# Patient Record
Sex: FEMALE | Race: WHITE | Employment: OTHER | URBAN - METROPOLITAN AREA
[De-identification: names, ages, dates, MRNs, and addresses within clinical notes are randomized per-mention and may not be internally consistent; named-entity substitution may affect disease eponyms.]

---

## 2017-08-21 ENCOUNTER — HOSPITAL ENCOUNTER (OUTPATIENT)
Dept: GENERAL RADIOLOGY | Age: 82
Discharge: HOME | End: 2017-08-21
Attending: GENERAL PRACTICE | Admitting: GENERAL PRACTICE
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
## 2017-08-21 DIAGNOSIS — M54.5 ACUTE LOW BACK PAIN, UNSPECIFIED BACK PAIN LATERALITY, WITH SCIATICA PRESENCE UNSPECIFIED: ICD-10-CM

## 2018-06-01 ENCOUNTER — HOSPITAL ENCOUNTER (OUTPATIENT)
Dept: OTHER | Age: 83
Discharge: HOME | End: 2018-06-30
Attending: INTERNAL MEDICINE | Admitting: INTERNAL MEDICINE

## 2018-06-15 LAB
ALBUMIN SERPL-MCNC: 4.2 GM/DL (ref 3.4–5)
ALP BLD-CCNC: 87 IU/L (ref 40–128)
ALT SERPL-CCNC: 7 U/L (ref 10–40)
ANION GAP SERPL CALCULATED.3IONS-SCNC: 11 MMOL/L (ref 4–16)
AST SERPL-CCNC: 18 IU/L (ref 15–37)
BILIRUB SERPL-MCNC: 0.6 MG/DL (ref 0–1)
BUN BLDV-MCNC: 23 MG/DL (ref 6–23)
CALCIUM SERPL-MCNC: 9 MG/DL (ref 8.3–10.6)
CHLORIDE BLD-SCNC: 106 MMOL/L (ref 99–110)
CHOLESTEROL: 158 MG/DL
CO2: 27 MMOL/L (ref 21–32)
CREAT SERPL-MCNC: 1.1 MG/DL (ref 0.6–1.1)
GFR AFRICAN AMERICAN: 56 ML/MIN/1.73M2
GFR NON-AFRICAN AMERICAN: 46 ML/MIN/1.73M2
GLUCOSE BLD-MCNC: 90 MG/DL (ref 70–99)
HCT VFR BLD CALC: 40.8 % (ref 37–47)
HDLC SERPL-MCNC: 45 MG/DL
HEMOGLOBIN: 12.7 GM/DL (ref 12.5–16)
LDL CHOLESTEROL DIRECT: 105 MG/DL
MCH RBC QN AUTO: 29.7 PG (ref 27–31)
MCHC RBC AUTO-ENTMCNC: 31.1 % (ref 32–36)
MCV RBC AUTO: 95.3 FL (ref 78–100)
PDW BLD-RTO: 13.9 % (ref 11.7–14.9)
PLATELET # BLD: 132 K/CU MM (ref 140–440)
PMV BLD AUTO: 10.4 FL (ref 7.5–11.1)
POTASSIUM SERPL-SCNC: 4.5 MMOL/L (ref 3.5–5.1)
RBC # BLD: 4.28 M/CU MM (ref 4.2–5.4)
SODIUM BLD-SCNC: 144 MMOL/L (ref 135–145)
TOTAL PROTEIN: 6.5 GM/DL (ref 6.4–8.2)
TRIGL SERPL-MCNC: 99 MG/DL
TSH HIGH SENSITIVITY: 5.59 UIU/ML (ref 0.27–4.2)
WBC # BLD: 4.1 K/CU MM (ref 4–10.5)

## 2018-06-22 LAB
T3 FREE: 2.8 PG/ML (ref 2.3–4.2)
T4 FREE: 1.08 NG/DL (ref 0.9–1.8)
TSH HIGH SENSITIVITY: 5.69 UIU/ML (ref 0.27–4.2)

## 2018-07-01 ENCOUNTER — HOSPITAL ENCOUNTER (OUTPATIENT)
Dept: OTHER | Age: 83
Discharge: HOME | End: 2018-07-31
Attending: INTERNAL MEDICINE | Admitting: INTERNAL MEDICINE

## 2018-11-20 ENCOUNTER — HOSPITAL ENCOUNTER (OUTPATIENT)
Age: 83
Setting detail: SPECIMEN
Discharge: HOME | End: 2018-11-20

## 2018-11-30 ENCOUNTER — HOSPITAL ENCOUNTER (OUTPATIENT)
Age: 83
Discharge: HOME | End: 2018-11-30
Payer: MEDICARE

## 2018-11-30 LAB
ALBUMIN SERPL-MCNC: 4 GM/DL (ref 3.4–5)
ALP BLD-CCNC: 79 IU/L (ref 40–128)
ALT SERPL-CCNC: 10 U/L (ref 10–40)
AMMONIA: 32 UMOL/L (ref 11–51)
ANION GAP SERPL CALCULATED.3IONS-SCNC: 14 MMOL/L (ref 4–16)
AST SERPL-CCNC: 19 IU/L (ref 15–37)
BILIRUB SERPL-MCNC: 0.4 MG/DL (ref 0–1)
BUN BLDV-MCNC: 28 MG/DL (ref 6–23)
CALCIUM SERPL-MCNC: 9.3 MG/DL (ref 8.3–10.6)
CHLORIDE BLD-SCNC: 103 MMOL/L (ref 99–110)
CO2: 24 MMOL/L (ref 21–32)
CREAT SERPL-MCNC: 1.1 MG/DL (ref 0.6–1.1)
GFR AFRICAN AMERICAN: 56 ML/MIN/1.73M2
GFR NON-AFRICAN AMERICAN: 46 ML/MIN/1.73M2
GLUCOSE BLD-MCNC: 99 MG/DL (ref 70–99)
HCT VFR BLD CALC: 38.7 % (ref 37–47)
HEMOGLOBIN: 12.4 GM/DL (ref 12.5–16)
MCH RBC QN AUTO: 29.7 PG (ref 27–31)
MCHC RBC AUTO-ENTMCNC: 32 % (ref 32–36)
MCV RBC AUTO: 92.8 FL (ref 78–100)
PDW BLD-RTO: 13.4 % (ref 11.7–14.9)
PLATELET # BLD: 161 K/CU MM (ref 140–440)
PMV BLD AUTO: 10 FL (ref 7.5–11.1)
POTASSIUM SERPL-SCNC: 4.3 MMOL/L (ref 3.5–5.1)
RBC # BLD: 4.17 M/CU MM (ref 4.2–5.4)
SODIUM BLD-SCNC: 141 MMOL/L (ref 135–145)
TOTAL PROTEIN: 6.5 GM/DL (ref 6.4–8.2)
TSH HIGH SENSITIVITY: 4.92 UIU/ML (ref 0.27–4.2)
WBC # BLD: 4.9 K/CU MM (ref 4–10.5)

## 2018-11-30 PROCEDURE — 80053 COMPREHEN METABOLIC PANEL: CPT

## 2018-11-30 PROCEDURE — 85027 COMPLETE CBC AUTOMATED: CPT

## 2018-11-30 PROCEDURE — 36415 COLL VENOUS BLD VENIPUNCTURE: CPT

## 2018-11-30 PROCEDURE — 84443 ASSAY THYROID STIM HORMONE: CPT

## 2018-11-30 PROCEDURE — 82140 ASSAY OF AMMONIA: CPT

## 2019-01-04 ENCOUNTER — HOSPITAL ENCOUNTER (OUTPATIENT)
Age: 84
Discharge: HOME | End: 2019-01-04

## 2019-01-04 LAB — TSH HIGH SENSITIVITY: 2.34 UIU/ML (ref 0.27–4.2)

## 2019-01-04 PROCEDURE — 84443 ASSAY THYROID STIM HORMONE: CPT

## 2019-01-04 PROCEDURE — 36415 COLL VENOUS BLD VENIPUNCTURE: CPT

## 2019-04-12 ENCOUNTER — HOSPITAL ENCOUNTER (OUTPATIENT)
Age: 84
Setting detail: SPECIMEN
Discharge: HOME | End: 2019-04-12
Payer: MEDICARE

## 2019-04-12 LAB
ALBUMIN SERPL-MCNC: 4.3 GM/DL (ref 3.4–5)
ALP BLD-CCNC: 90 IU/L (ref 40–129)
ALT SERPL-CCNC: 8 U/L (ref 10–40)
ANION GAP SERPL CALCULATED.3IONS-SCNC: 13 MMOL/L (ref 4–16)
AST SERPL-CCNC: 17 IU/L (ref 15–37)
BILIRUB SERPL-MCNC: 0.3 MG/DL (ref 0–1)
BUN BLDV-MCNC: 22 MG/DL (ref 6–23)
CALCIUM SERPL-MCNC: 9.1 MG/DL (ref 8.3–10.6)
CHLORIDE BLD-SCNC: 105 MMOL/L (ref 99–110)
CO2: 25 MMOL/L (ref 21–32)
CREAT SERPL-MCNC: 1 MG/DL (ref 0.6–1.1)
GFR AFRICAN AMERICAN: >60 ML/MIN/1.73M2
GFR NON-AFRICAN AMERICAN: 52 ML/MIN/1.73M2
GLUCOSE BLD-MCNC: 88 MG/DL (ref 70–99)
HCT VFR BLD CALC: 42.9 % (ref 37–47)
HEMOGLOBIN: 13.1 GM/DL (ref 12.5–16)
MCH RBC QN AUTO: 28.8 PG (ref 27–31)
MCHC RBC AUTO-ENTMCNC: 30.5 % (ref 32–36)
MCV RBC AUTO: 94.3 FL (ref 78–100)
PDW BLD-RTO: 14.1 % (ref 11.7–14.9)
PLATELET # BLD: 174 K/CU MM (ref 140–440)
PMV BLD AUTO: 10 FL (ref 7.5–11.1)
POTASSIUM SERPL-SCNC: 4.7 MMOL/L (ref 3.5–5.1)
RBC # BLD: 4.55 M/CU MM (ref 4.2–5.4)
SODIUM BLD-SCNC: 143 MMOL/L (ref 135–145)
TOTAL PROTEIN: 6.6 GM/DL (ref 6.4–8.2)
TSH HIGH SENSITIVITY: 3.61 UIU/ML (ref 0.27–4.2)
WBC # BLD: 5.6 K/CU MM (ref 4–10.5)

## 2019-04-12 PROCEDURE — 84443 ASSAY THYROID STIM HORMONE: CPT

## 2019-04-12 PROCEDURE — 80053 COMPREHEN METABOLIC PANEL: CPT

## 2019-04-12 PROCEDURE — 85027 COMPLETE CBC AUTOMATED: CPT

## 2019-04-12 PROCEDURE — 36415 COLL VENOUS BLD VENIPUNCTURE: CPT

## 2019-08-30 ENCOUNTER — HOSPITAL ENCOUNTER (OUTPATIENT)
Age: 84
Discharge: HOME | End: 2019-08-30
Payer: MEDICARE

## 2019-08-30 LAB
ALBUMIN SERPL-MCNC: 3.8 GM/DL (ref 3.4–5)
ALP BLD-CCNC: 85 IU/L (ref 40–128)
ALT SERPL-CCNC: 8 U/L (ref 10–40)
ANION GAP SERPL CALCULATED.3IONS-SCNC: 10 MMOL/L (ref 4–16)
AST SERPL-CCNC: 17 IU/L (ref 15–37)
BILIRUB SERPL-MCNC: 0.3 MG/DL (ref 0–1)
BUN BLDV-MCNC: 25 MG/DL (ref 6–23)
CALCIUM SERPL-MCNC: 9.2 MG/DL (ref 8.3–10.6)
CHLORIDE BLD-SCNC: 104 MMOL/L (ref 99–110)
CO2: 26 MMOL/L (ref 21–32)
CREAT SERPL-MCNC: 1.2 MG/DL (ref 0.6–1.1)
GFR AFRICAN AMERICAN: 51 ML/MIN/1.73M2
GFR NON-AFRICAN AMERICAN: 42 ML/MIN/1.73M2
GLUCOSE BLD-MCNC: 87 MG/DL (ref 70–99)
HCT VFR BLD CALC: 39.8 % (ref 37–47)
HEMOGLOBIN: 12.5 GM/DL (ref 12.5–16)
MCH RBC QN AUTO: 29.5 PG (ref 27–31)
MCHC RBC AUTO-ENTMCNC: 31.4 % (ref 32–36)
MCV RBC AUTO: 93.9 FL (ref 78–100)
PDW BLD-RTO: 13.7 % (ref 11.7–14.9)
PLATELET # BLD: 133 K/CU MM (ref 140–440)
PMV BLD AUTO: 9.8 FL (ref 7.5–11.1)
POTASSIUM SERPL-SCNC: 4.3 MMOL/L (ref 3.5–5.1)
RBC # BLD: 4.24 M/CU MM (ref 4.2–5.4)
SODIUM BLD-SCNC: 140 MMOL/L (ref 135–145)
TOTAL PROTEIN: 6.2 GM/DL (ref 6.4–8.2)
TSH HIGH SENSITIVITY: 2.16 UIU/ML (ref 0.27–4.2)
WBC # BLD: 4.6 K/CU MM (ref 4–10.5)

## 2019-08-30 PROCEDURE — 84443 ASSAY THYROID STIM HORMONE: CPT

## 2019-08-30 PROCEDURE — 80053 COMPREHEN METABOLIC PANEL: CPT

## 2019-08-30 PROCEDURE — 36415 COLL VENOUS BLD VENIPUNCTURE: CPT

## 2019-08-30 PROCEDURE — 85027 COMPLETE CBC AUTOMATED: CPT

## 2020-08-13 ENCOUNTER — HOSPITAL ENCOUNTER (OUTPATIENT)
Age: 85
Setting detail: SPECIMEN
Discharge: HOME | End: 2020-08-13
Payer: MEDICARE

## 2020-08-13 PROCEDURE — U0002 COVID-19 LAB TEST NON-CDC: HCPCS

## 2020-08-14 LAB
SARS-COV-2: NOT DETECTED
SOURCE: NORMAL

## 2020-09-07 ENCOUNTER — APPOINTMENT (OUTPATIENT)
Dept: CT IMAGING | Age: 85
End: 2020-09-07
Payer: MEDICARE

## 2020-09-07 ENCOUNTER — HOSPITAL ENCOUNTER (EMERGENCY)
Age: 85
Discharge: HOME | End: 2020-09-07
Payer: MEDICARE

## 2020-09-07 VITALS
SYSTOLIC BLOOD PRESSURE: 169 MMHG | RESPIRATION RATE: 18 BRPM | HEART RATE: 70 BPM | HEIGHT: 61 IN | WEIGHT: 160 LBS | DIASTOLIC BLOOD PRESSURE: 77 MMHG | TEMPERATURE: 98.6 F | OXYGEN SATURATION: 97 % | BODY MASS INDEX: 30.21 KG/M2

## 2020-09-07 PROCEDURE — 72125 CT NECK SPINE W/O DYE: CPT

## 2020-09-07 PROCEDURE — 6370000000 HC RX 637 (ALT 250 FOR IP): Performed by: PHYSICIAN ASSISTANT

## 2020-09-07 PROCEDURE — 2500000003 HC RX 250 WO HCPCS: Performed by: PHYSICIAN ASSISTANT

## 2020-09-07 PROCEDURE — 4500000028 HC INTERMEDIATE PROCEDURE

## 2020-09-07 PROCEDURE — 70486 CT MAXILLOFACIAL W/O DYE: CPT

## 2020-09-07 PROCEDURE — 99284 EMERGENCY DEPT VISIT MOD MDM: CPT

## 2020-09-07 PROCEDURE — 70450 CT HEAD/BRAIN W/O DYE: CPT

## 2020-09-07 RX ORDER — DIAPER,BRIEF,INFANT-TODD,DISP
EACH MISCELLANEOUS ONCE
Status: COMPLETED | OUTPATIENT
Start: 2020-09-07 | End: 2020-09-07

## 2020-09-07 RX ORDER — ACETAMINOPHEN 325 MG/1
650 TABLET ORAL ONCE
Status: COMPLETED | OUTPATIENT
Start: 2020-09-07 | End: 2020-09-07

## 2020-09-07 RX ORDER — LIDOCAINE HYDROCHLORIDE 20 MG/ML
10 INJECTION, SOLUTION INFILTRATION; PERINEURAL ONCE
Status: COMPLETED | OUTPATIENT
Start: 2020-09-07 | End: 2020-09-07

## 2020-09-07 RX ORDER — LIDOCAINE/RACEPINEP/TETRACAINE 4-0.05-0.5
SOLUTION WITH PREFILLED APPLICATOR (ML) TOPICAL ONCE
Status: COMPLETED | OUTPATIENT
Start: 2020-09-07 | End: 2020-09-07

## 2020-09-07 RX ADMIN — Medication 3 ML: at 11:29

## 2020-09-07 RX ADMIN — BACITRACIN ZINC 1 G: 500 OINTMENT TOPICAL at 13:22

## 2020-09-07 RX ADMIN — LIDOCAINE HYDROCHLORIDE 10 ML: 20 INJECTION, SOLUTION INFILTRATION; PERINEURAL at 12:45

## 2020-09-07 RX ADMIN — ACETAMINOPHEN 650 MG: 325 TABLET ORAL at 11:28

## 2020-09-07 ASSESSMENT — PAIN SCALES - GENERAL
PAINLEVEL_OUTOF10: 5
PAINLEVEL_OUTOF10: 2
PAINLEVEL_OUTOF10: 1

## 2020-09-07 NOTE — ED NOTES
This nurse spoke with the patient's ride back home. She is on her way here.       Miguel Barlow RN  09/07/20 [id number]

## 2020-09-07 NOTE — ED PROVIDER NOTES
Allergies    SOCIAL & FAMILY HISTORY    Social History     Socioeconomic History    Marital status:      Spouse name: Not on file    Number of children: Not on file    Years of education: Not on file    Highest education level: Not on file   Occupational History    Not on file   Social Needs    Financial resource strain: Not on file    Food insecurity     Worry: Not on file     Inability: Not on file    Transportation needs     Medical: Not on file     Non-medical: Not on file   Tobacco Use    Smoking status: Never Smoker   Substance and Sexual Activity    Alcohol use: Yes     Comment: q. six months    Drug use: No    Sexual activity: Not on file   Lifestyle    Physical activity     Days per week: Not on file     Minutes per session: Not on file    Stress: Not on file   Relationships    Social connections     Talks on phone: Not on file     Gets together: Not on file     Attends Denominational service: Not on file     Active member of club or organization: Not on file     Attends meetings of clubs or organizations: Not on file     Relationship status: Not on file    Intimate partner violence     Fear of current or ex partner: Not on file     Emotionally abused: Not on file     Physically abused: Not on file     Forced sexual activity: Not on file   Other Topics Concern    Not on file   Social History Narrative    Not on file     No family history on file. PHYSICAL EXAM    VITAL SIGNS: BP (!) 185/73   Pulse 69   Temp 98 °F (36.7 °C) (Oral)   Resp 16   Ht 5' 1\" (1.549 m)   Wt 160 lb (72.6 kg)   SpO2 94%   BMI 30.23 kg/m²    Constitutional:  Well developed, well nourished, no acute distress   Eyes: EOMI. PERRL, sclera nonicteric. Anterior chambers clear. Funduscopic exam without any gross abnormality or hemorrhages. HENT:  Atraumatic, no trismus. Ears canals and TMs free of blood or clear fluid. Nasal passages and oropharynx free of blood or clear fluid.   Evidence of bruising into the nasal bridge area, no blood in the bilateral nares, no obvious signs of septal hematoma. No circumferential periorbital ecchymosis or mastoid ecchymosis noted. Neck/Lymphatics: supple, no JVD, no swollen nodes. No posterior neck tenderness. Range of motion without obvious pain or deficit. Respiratory:  Lungs Clear, no retractions   Cardiovascular:   normal rate, no murmurs  GI:  Soft, nontender, normal bowel soun*ds  Musculoskeletal:  No edema, no obvious defect or deformity, palpable bony tenderness, swelling, discoloration to the bilateral upper and lower extremities. Bilateral upper and lower extremities neurovascular intact. BACK: There is not thoracic or lumbar midline tenderness to palpation or step-offs. Paraspinal tenderness to palpation is not present in the paracervical, parathoracic and paralumbar region. No overlying rashes. LE strength is 5/5. LE light touch is intact. LE DTR's are 2+ in the patellas and achilles. Straight leg test is negative on the RIGHT, negative on the LEFT. Integument: 2 cm superficial laceration to the chin area, no active bleeding. Has a small 1 cm laceration to the bottom lip area, not through and through  Neurologic:    - Alert & oriented person, place, time, and situation, no speech difficulties or slurring.  - No obvious gross motor deficits  - Cranial nerves 2-12 grossly intact  - Negative meningeal signs.  - Sensation intact to light touch  - Strength 5/5 in upper and lower extremities bilaterally  - Light touch sensation intact throughout. - Upper and lower extremity DTRs 2+ bilaterally.   Psych: Pleasant affect, no hallucinations      RADIOLOGY   Ct Head Wo Contrast    Result Date: 9/7/2020  EXAMINATION: CT OF THE HEAD WITHOUT CONTRAST; CT OF THE FACE WITHOUT CONTRAST  9/7/2020 11:58 am TECHNIQUE: CT of the head was performed without the administration of intravenous contrast. Dose modulation, iterative reconstruction, and/or weight based adjustment of the mA/kV was utilized to reduce the radiation dose to as low as reasonably achievable.; CT of the face was performed without the administration of intravenous contrast. Multiplanar reformatted images are provided for review. Dose modulation, iterative reconstruction, and/or weight based adjustment of the mA/kV was utilized to reduce the radiation dose to as low as reasonably achievable. COMPARISON: None. HISTORY: ORDERING SYSTEM PROVIDED HISTORY: fall TECHNOLOGIST PROVIDED HISTORY: Reason for exam:->fall Has a \"code stroke\" or \"stroke alert\" been called? ->No Reason for Exam: fall, head/neck/facial pain. Acuity: Acute Type of Exam: Initial Mechanism of Injury: fall, head/neck/facial pain. Relevant Medical/Surgical History: none FINDINGS: BRAIN/VENTRICLES: The cerebral and cerebellar parenchyma demonstrate volume loss. There are scattered and confluent low-attenuation areas noted supratentorially, compatible with chronic microvascular white matter ischemic disease. There are no areas of acute hemorrhage, mass, or midline shift. The ventricles are enlarged, likely related to involutional change. Gray-white differentiation is maintained without evidence of acute infarct. ORBITS: Lens implants from cataract surgery are noted bilaterally. No soft tissue swelling. SINUSES: There is scattered mild paranasal sinus disease with greatest involvement in the right maxillary sinus. The mastoid air cells are clear. SOFT TISSUES/SKULL:  The facial bones are intact. Asymmetric soft tissue swelling is noted along the left mandibular symphysis. Streak artifact from dental amalgam is noted. There is no evidence of acute facial bone fracture. No appreciable scalp soft tissue swelling. The calvarium is intact. 1. No acute intracranial abnormality. 2. Cerebral and cerebellar parenchymal volume loss with chronic microvascular white matter ischemic disease. 3. Soft tissue swelling along the left mandibular symphysis.   No evidence of an underlying facial bone fracture. Ct Facial Bones Wo Contrast    Result Date: 9/7/2020  EXAMINATION: CT OF THE HEAD WITHOUT CONTRAST; CT OF THE FACE WITHOUT CONTRAST  9/7/2020 11:58 am TECHNIQUE: CT of the head was performed without the administration of intravenous contrast. Dose modulation, iterative reconstruction, and/or weight based adjustment of the mA/kV was utilized to reduce the radiation dose to as low as reasonably achievable.; CT of the face was performed without the administration of intravenous contrast. Multiplanar reformatted images are provided for review. Dose modulation, iterative reconstruction, and/or weight based adjustment of the mA/kV was utilized to reduce the radiation dose to as low as reasonably achievable. COMPARISON: None. HISTORY: ORDERING SYSTEM PROVIDED HISTORY: fall TECHNOLOGIST PROVIDED HISTORY: Reason for exam:->fall Has a \"code stroke\" or \"stroke alert\" been called? ->No Reason for Exam: fall, head/neck/facial pain. Acuity: Acute Type of Exam: Initial Mechanism of Injury: fall, head/neck/facial pain. Relevant Medical/Surgical History: none FINDINGS: BRAIN/VENTRICLES: The cerebral and cerebellar parenchyma demonstrate volume loss. There are scattered and confluent low-attenuation areas noted supratentorially, compatible with chronic microvascular white matter ischemic disease. There are no areas of acute hemorrhage, mass, or midline shift. The ventricles are enlarged, likely related to involutional change. Gray-white differentiation is maintained without evidence of acute infarct. ORBITS: Lens implants from cataract surgery are noted bilaterally. No soft tissue swelling. SINUSES: There is scattered mild paranasal sinus disease with greatest involvement in the right maxillary sinus. The mastoid air cells are clear. SOFT TISSUES/SKULL:  The facial bones are intact. Asymmetric soft tissue swelling is noted along the left mandibular symphysis.   Streak artifact from tolerated procedure well without complications. Total repaired wound length: 3 cm    Discussed with Pt (and/or family member) at bedside today:  I discussed possibility of infection, retained foreign body, tendon injury, nerve injury. Wound care and scar minimization education was provided. Instructions were given to return for increasing pain, redness, streaking, discharge, or any other worsening or worrisome concerns. Wound check in 48 hours. Suture/Staple removal in 7 days. ________________________________________________________________________      ED COURSE & MEDICAL DECISION MAKING      Patient presents as above. Emergent etiologies considered. In brief, patient is presenting after mechanical fall. States that she tripped over her own feet causing her to fall forward. States that she did hit her chin on the concrete, has a laceration to this area, there is also intraoral laceration. Does have some bruising swelling to the bridge of the nose, denies any active pain, headache, numbness and tingling to the bilateral upper or lower extremities. She does admit that she falls frequently, denies preceding symptoms, states that she has been feeling well otherwise. Imaging obtained, CT of the head, facial bones and cervical spine all acutely negative. Lacerations were cleaned and sutured. Patient was able to ambulate in the ED showing to be at her baseline. At this point we will look to discharge with strict return precautions, follow-up, wound care instructions. Return to emergency Department precautions were discussed in detail with patient and include worsening pain, new symptoms. All pertinent Lab data and radiographic results reviewed with patient at bedside. The patient and/or the family were informed of the results of any tests/labs/imaging, the treatment plan, and time was allotted to answer questions. Clinical  IMPRESSION    1. Fall, initial encounter    2.  Closed head injury, initial encounter    3. Facial contusion, initial encounter    4. Chin laceration, initial encounter    5. Lip laceration, initial encounter      Comment: Please note this report has been produced using speech recognition software and may contain errors related to that system including errors in grammar, punctuation, and spelling, as well as words and phrases that may be inappropriate. If there are any questions or concerns please feel free to contact the dictating provider for clarification.       Katherine Trejo 411, PA  09/07/20 7924

## 2021-03-26 ENCOUNTER — HOSPITAL ENCOUNTER (OUTPATIENT)
Age: 86
Setting detail: SPECIMEN
Discharge: HOME | End: 2021-03-26
Payer: MEDICARE

## 2021-03-26 LAB
ALBUMIN SERPL-MCNC: 3.8 GM/DL (ref 3.4–5)
ALP BLD-CCNC: 90 IU/L (ref 40–128)
ALT SERPL-CCNC: 6 U/L (ref 10–40)
ANION GAP SERPL CALCULATED.3IONS-SCNC: 9 MMOL/L (ref 4–16)
AST SERPL-CCNC: 15 IU/L (ref 15–37)
BILIRUB SERPL-MCNC: 0.5 MG/DL (ref 0–1)
BUN BLDV-MCNC: 20 MG/DL (ref 6–23)
CALCIUM SERPL-MCNC: 8.7 MG/DL (ref 8.3–10.6)
CHLORIDE BLD-SCNC: 105 MMOL/L (ref 99–110)
CO2: 27 MMOL/L (ref 21–32)
CREAT SERPL-MCNC: 1.1 MG/DL (ref 0.6–1.1)
GFR AFRICAN AMERICAN: 56 ML/MIN/1.73M2
GFR NON-AFRICAN AMERICAN: 46 ML/MIN/1.73M2
GLUCOSE BLD-MCNC: 73 MG/DL (ref 70–99)
HCT VFR BLD CALC: 40.7 % (ref 37–47)
HEMOGLOBIN: 12.8 GM/DL (ref 12.5–16)
MCH RBC QN AUTO: 29.9 PG (ref 27–31)
MCHC RBC AUTO-ENTMCNC: 31.4 % (ref 32–36)
MCV RBC AUTO: 95.1 FL (ref 78–100)
PDW BLD-RTO: 13.8 % (ref 11.7–14.9)
PLATELET # BLD: 138 K/CU MM (ref 140–440)
PMV BLD AUTO: 10.2 FL (ref 7.5–11.1)
POTASSIUM SERPL-SCNC: 4.8 MMOL/L (ref 3.5–5.1)
RBC # BLD: 4.28 M/CU MM (ref 4.2–5.4)
SODIUM BLD-SCNC: 141 MMOL/L (ref 135–145)
TOTAL PROTEIN: 6.1 GM/DL (ref 6.4–8.2)
TSH HIGH SENSITIVITY: 2.2 UIU/ML (ref 0.27–4.2)
WBC # BLD: 3.5 K/CU MM (ref 4–10.5)

## 2021-03-26 PROCEDURE — 36415 COLL VENOUS BLD VENIPUNCTURE: CPT

## 2021-03-26 PROCEDURE — 85027 COMPLETE CBC AUTOMATED: CPT

## 2021-03-26 PROCEDURE — 84443 ASSAY THYROID STIM HORMONE: CPT

## 2021-03-26 PROCEDURE — 80053 COMPREHEN METABOLIC PANEL: CPT

## 2021-12-02 ENCOUNTER — APPOINTMENT (OUTPATIENT)
Dept: GENERAL RADIOLOGY | Age: 86
End: 2021-12-02
Payer: MEDICARE

## 2021-12-02 ENCOUNTER — HOSPITAL ENCOUNTER (EMERGENCY)
Age: 86
Discharge: HOME | End: 2021-12-02
Payer: MEDICARE

## 2021-12-02 VITALS
OXYGEN SATURATION: 96 % | WEIGHT: 160 LBS | RESPIRATION RATE: 17 BRPM | TEMPERATURE: 98 F | BODY MASS INDEX: 29.44 KG/M2 | SYSTOLIC BLOOD PRESSURE: 116 MMHG | HEIGHT: 62 IN | HEART RATE: 72 BPM | DIASTOLIC BLOOD PRESSURE: 84 MMHG

## 2021-12-02 DIAGNOSIS — M79.601 PAIN OF RIGHT UPPER EXTREMITY: Primary | ICD-10-CM

## 2021-12-02 DIAGNOSIS — M54.50 ACUTE MIDLINE LOW BACK PAIN WITHOUT SCIATICA: ICD-10-CM

## 2021-12-02 DIAGNOSIS — M25.559 HIP PAIN: ICD-10-CM

## 2021-12-02 LAB
EKG ATRIAL RATE: 67 BPM
EKG DIAGNOSIS: NORMAL
EKG P AXIS: 24 DEGREES
EKG P-R INTERVAL: 170 MS
EKG Q-T INTERVAL: 418 MS
EKG QRS DURATION: 96 MS
EKG QTC CALCULATION (BAZETT): 441 MS
EKG R AXIS: -23 DEGREES
EKG T AXIS: 37 DEGREES
EKG VENTRICULAR RATE: 67 BPM

## 2021-12-02 PROCEDURE — 73502 X-RAY EXAM HIP UNI 2-3 VIEWS: CPT

## 2021-12-02 PROCEDURE — 73060 X-RAY EXAM OF HUMERUS: CPT

## 2021-12-02 PROCEDURE — 72070 X-RAY EXAM THORAC SPINE 2VWS: CPT

## 2021-12-02 PROCEDURE — 99283 EMERGENCY DEPT VISIT LOW MDM: CPT

## 2021-12-02 PROCEDURE — 93005 ELECTROCARDIOGRAM TRACING: CPT | Performed by: EMERGENCY MEDICINE

## 2021-12-02 PROCEDURE — 72100 X-RAY EXAM L-S SPINE 2/3 VWS: CPT

## 2021-12-02 PROCEDURE — 93010 ELECTROCARDIOGRAM REPORT: CPT | Performed by: INTERNAL MEDICINE

## 2021-12-02 RX ORDER — TIZANIDINE 2 MG/1
2 TABLET ORAL EVERY 12 HOURS PRN
Qty: 8 TABLET | Refills: 0 | Status: SHIPPED | OUTPATIENT
Start: 2021-12-02

## 2021-12-02 ASSESSMENT — PAIN DESCRIPTION - LOCATION: LOCATION: ARM

## 2021-12-02 ASSESSMENT — PAIN DESCRIPTION - ORIENTATION: ORIENTATION: RIGHT

## 2021-12-02 ASSESSMENT — PAIN DESCRIPTION - PAIN TYPE: TYPE: ACUTE PAIN

## 2021-12-02 ASSESSMENT — PAIN DESCRIPTION - DESCRIPTORS: DESCRIPTORS: ACHING

## 2021-12-02 ASSESSMENT — PAIN SCALES - GENERAL: PAINLEVEL_OUTOF10: 5

## 2021-12-02 NOTE — ED PROVIDER NOTES
As provider-in-triage, I performed a medical screening history and physical exam on this patient. HISTORY OF PRESENT ILLNESS  Babette Leventhal is a 80 y.o. female sewing and ironing often for quilting. On Monday, she developed pain in her right arm, shoulder, and back. The pain is so severe she feels like she could vomit. She states the pain is worse at night and it spasms. She also has chronic bilateral hip and back pain as well. Nothing alleviates the pain, ROM and palpation exacerbate her symptoms. PHYSICAL EXAM  /84   Pulse 70   Temp 97.9 °F (36.6 °C) (Oral)   Resp 15   Ht 5' 2\" (1.575 m)   Wt 160 lb (72.6 kg)   SpO2 96%   BMI 29.26 kg/m²     On exam, the patient appears in no acute distress. Speech is clear. Breathing is unlabored. Moves all extremities    Comment: Please note this report has been produced using speech recognition software and may contain errors related to that system including errors in grammar, punctuation, and spelling, as well as words and phrases that may be inappropriate. If there are any questions or concerns please feel free to contact the dictating provider for clarification.       Mariann Petty, APRN - CNP  12/02/21 [address] [facility] [place], APRN - CNP  12/02/21 [facility], APRN - [facility]  12/02/21 [id number]

## 2021-12-02 NOTE — ED PROVIDER NOTES
EMERGENCY DEPARTMENT ENCOUNTER      PCP: Chris Hawk MD    [facility]    Chief Complaint   Patient presents with    Arm Pain     states for approx. one week. denies C/P or SOB. HPI    Ana Cintron is a 80 y.o. female who presents with daughter for complaints of severe right bicep/upper arm pain (worse at night), chronic back pain, and chronic hip pain. Daughter states the patient has been having severe right arm pain after ironing and sewing patient states she is having spasms at night and the pain in her arm is so severe she wants to vomit. He does not currently have pain in her arm at this time. Daughter states they have asked her not to so or iron for the month of December. She also is complaining of chronic back and hip pain. She is currently rating her pain 5/10, aching, and constant. Palpation and range of motion exacerbate her symptoms, nothing has alleviated her symptoms. She denies any fevers, chills, nausea, vomiting, chest pain, trauma, or bladder changes, saddle anesthesia, IV drug use, weakness, or other complaints. REVIEW OF SYSTEMS    Constitutional:  Denies fever, chills, weight loss or weakness   HENT:  Denies sore throat or ear pain   Cardiovascular:  Denies chest pain, palpitations   Respiratory:  Denies cough or shortness of breath    GI:  Denies abdominal pain, nausea, vomiting, or diarrhea  :  Denies any urinary symptoms or vaginal symptoms. Musculoskeletal: See HPI.   Skin:  Denies rash  Neurologic:  Denies headache, focal weakness or sensory changes   Endocrine:  Denies polyuria or polydypsia   Lymphatic:  Denies swollen glands     All other review of systems are negative  See HPI and nursing notes for additional information     PAST MEDICAL AND SURGICAL HISTORY    Past Medical History:   Diagnosis Date    Hyperlipidemia     Hypertension      Past Surgical History:   Procedure Laterality Date    APPENDECTOMY      FRACTURE SURGERY      HYSTERECTOMY         CURRENT MEDICATIONS    Current Outpatient Rx   Medication Sig Dispense Refill    tiZANidine (ZANAFLEX) 2 MG tablet Take 1 tablet by mouth every 12 hours as needed (spasm) 8 tablet 0    lovastatin (MEVACOR) 40 MG tablet Take 40 mg by mouth daily      metoprolol (LOPRESSOR) 50 MG tablet Take 50 mg by mouth 2 times daily      aspirin 81 MG chewable tablet Take 81 mg by mouth daily         ALLERGIES    No Known Allergies    SOCIAL AND FAMILY HISTORY    Social History     Socioeconomic History    Marital status:      Spouse name: None    Number of children: None    Years of education: None    Highest education level: None   Occupational History    None   Tobacco Use    Smoking status: Never Smoker    Smokeless tobacco: Never Used   Substance and Sexual Activity    Alcohol use: Yes     Comment: q. six months    Drug use: No    Sexual activity: None   Other Topics Concern    None   Social History Narrative    None     Social Determinants of Health     Financial Resource Strain:     Difficulty of Paying Living Expenses: Not on file   Food Insecurity:     Worried About Running Out of Food in the Last Year: Not on file    Darlene of Food in the Last Year: Not on file   Transportation Needs:     Lack of Transportation (Medical): Not on file    Lack of Transportation (Non-Medical):  Not on file   Physical Activity:     Days of Exercise per Week: Not on file    Minutes of Exercise per Session: Not on file   Stress:     Feeling of Stress : Not on file   Social Connections:     Frequency of Communication with Friends and Family: Not on file    Frequency of Social Gatherings with Friends and Family: Not on file    Attends Scientology Services: Not on file    Active Member of Clubs or Organizations: Not on file    Attends Club or Organization Meetings: Not on file    Marital Status: Not on file   Intimate Partner Violence:     Fear of Current or Ex-Partner: Not on file   [employer] Emotionally Abused: Not on file    Physically Abused: Not on file    Sexually Abused: Not on file   Housing Stability:     Unable to Pay for Housing in the Last Year: Not on file    Number of Places Lived in the Last Year: Not on file    Unstable Housing in the Last Year: Not on file     History reviewed. No pertinent family history. PHYSICAL EXAM    VITAL SIGNS: /84   Pulse 72   Temp 98 °F (36.7 °C) (Oral)   Resp 17   Ht 5' 2\" (1.575 m)   Wt 160 lb (72.6 kg)   SpO2 96%   BMI 29.26 kg/m²    Constitutional:  Well developed, Well nourished. No distress  HENT:  Normocephalic, Atraumatic, PERRL. EOMI. Sclera clear. Conjunctiva normal, No discharge. Neck/Lymphatics: supple, no JVD, no swollen nodes  Cardiovascular:   RRR,  no murmurs/rubs/gallops. Respiratory:  Nonlabored breathing. Normal breath sounds, No wheezing. Tender to palpation. Abdomen: Bowel sounds normal, Soft, No tenderness, no masses. Musculoskeletal:    Tenderness with palpation of right bicep area. No obvious deformities, erythema, warmth, rash or lesions. Full range of motion in right shoulder, elbow, and wrist without tenderness. Vascularly intact distal to area of concern. Distal pulses 2+ bilaterally. Bilateral hips and pelvis appear normal.  No obvious deformities, erythema, or warmth. Full range of motion in bilateral hips without tenderness. Full range of motion bilateral knees and ankles without tenderness. Neurovascularly intact distal to area of concern. Distal pulses 2+ bilaterally. There is no edema, asymmetry, or calf / thigh tenderness bilaterally. No cyanosis. No cool or pale-appearing limb. Distal cap refill and pulses intact bilateral upper and lower extremities  Bilateral upper and lower extremity ROM intact without pain or obvious deficit  Back:   - No gross deformity, swelling, or discoloration.    -  + generalized lumbar and Paralumbar tenderness thoracic and lumbar spine without focus. No masses, fluctuance, warmth, or skin changes.  - No localized midline bony tenderness.   - No change in pain with forward flexion  - SLR test negative     No CVA tenderness to percussion  Neurologic:    No obvious neurological deficits. Patient moves without obvious difficulty or weakness. HIGH sensitivity Neuro Exam for Lumbar spine  -(L1-L2)  inner thigh sensation intact  -(S3,4,5) Groin sensation intact     -Lower extremity ROM intact including:       -(L2) cross legs (adduct thighs)        -(L3) extend knees & flex knees (S1)       -(L4) dorsiflex ankles       -(L5) point great toes upward & plantar flex toes (S2)        -(L2,3,4) Knee reflexes intact bilaterally  Integument:  Warm, Dry  Psychiatric:  Affect normal, Mood normal.         EKG    EKG shows normal sinus rhythm with left ventricular hypertrophy with repolarization 67 bpm.  Please see emergency department physician's note for final diagnoses and plan. RADIOLOGY    XR LUMBAR SPINE (2-3 VIEWS)   Preliminary Result   Likely chronic L1 superior endplate compression fracture with approximately   20% loss of vertebral body height. Moderate multilevel disc degenerative changes. XR THORACIC SPINE (2 VIEWS)   Final Result   Mild multilevel degenerative changes of the thoracic spine. No acute focal   abnormality of the thoracic spine. 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 No acute fracture or dislocation. 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 No acute fracture or dislocation. XR HUMERUS RIGHT (MIN 2 VIEWS)   Final Result   No acute abnormality of the humerus.                    ED COURSE & MEDICAL DECISION MAKING       80year-old female presents emergency department with complaints of severe right arm pain worse at night after ironing and sewing and chronic back and hip pain. EKG obtained and showed a sinus rhythm. X-ray of the right humerus was obtained and showed no acute abnormality. X-ray of the right and left pelvis were obtained and showed mild bilateral SI and bilateral hip osteoarthritis, no acute fracture or dislocation. X-ray of the thoracic spine shows mild multilevel degenerative changes no acute focal abnormality. X-ray of the lumbar spine showed a likely chronic L1 superior endplate compression fracture with approximately 20% loss of vertebral body height. She is neurologically intact. She denies any falls or trauma. This time, I feel the patient can be managed outpatient. Patient and granddaughter were made aware of these findings and instructed to follow-up with her primary care provider this week regarding the spinal x-rays. Patient was instructed to use Tylenol as directed for pain and was given a small prescription for tizanidine. She was encouraged to quit ironing and sewing for a month to see if her symptoms improved. She was instructed to return here immediately with any worsening symptoms. Patient agrees to return emergency department if symptoms worsen or any new symptoms develop. Vital signs and nursing notes reviewed during ED course. Differentials include acute fracture, dislocation, compartment syndrome, infectious process, discitis, epidural abscess, cauda equina    Clinical  IMPRESSION    1. Pain of right upper extremity    2. Hip pain    3. Acute midline low back pain without sciatica              Comment: Please note this report has been produced using speech recognition software and may contain errors related to that system including errors in grammar, punctuation, and spelling, as well as words and phrases that may be inappropriate. If there are any questions or concerns please feel free to contact the dictating provider for clarification.      NAEEM Campos - TOMÁS  12/02/21 [id number]

## 2021-12-02 NOTE — ED PROVIDER NOTES
12 lead EKG per my interpretation:  Normal Sinus Rhythm 67  Axis is   Normal  QTc is  441  There is no specific T wave changes appreciated. There is no specific ST wave changes appreciated.     Prior EKG to compare with was not available         Roselynn Aase, DO  12/02/21 1120

## 2021-12-02 NOTE — ED TRIAGE NOTES
Patient to triage via EMS with c/o right arm pain that started approx. One week ago. Patient states she thinks she has been ironing too much. Denies any other symptoms. Resps even and unlabored.

## 2021-12-03 ENCOUNTER — HOSPITAL ENCOUNTER (OUTPATIENT)
Age: 86
Setting detail: SPECIMEN
Discharge: HOME | End: 2021-12-03
Payer: MEDICARE

## 2021-12-03 LAB
ALBUMIN SERPL-MCNC: 4.2 GM/DL (ref 3.4–5)
ALP BLD-CCNC: 92 IU/L (ref 40–128)
ALT SERPL-CCNC: 10 U/L (ref 10–40)
ANION GAP SERPL CALCULATED.3IONS-SCNC: 10 MMOL/L (ref 4–16)
AST SERPL-CCNC: 38 IU/L (ref 15–37)
BILIRUB SERPL-MCNC: 0.6 MG/DL (ref 0–1)
BUN BLDV-MCNC: 19 MG/DL (ref 6–23)
C-REACTIVE PROTEIN, HIGH SENSITIVITY: 5 MG/L
CALCIUM SERPL-MCNC: 9.4 MG/DL (ref 8.3–10.6)
CHLORIDE BLD-SCNC: 103 MMOL/L (ref 99–110)
CO2: 27 MMOL/L (ref 21–32)
CREAT SERPL-MCNC: 1 MG/DL (ref 0.6–1.1)
ERYTHROCYTE SEDIMENTATION RATE: 7 MM/HR (ref 0–30)
GFR AFRICAN AMERICAN: >60 ML/MIN/1.73M2
GFR NON-AFRICAN AMERICAN: 51 ML/MIN/1.73M2
GLUCOSE BLD-MCNC: 100 MG/DL (ref 70–99)
HCT VFR BLD CALC: 40.3 % (ref 37–47)
HEMOGLOBIN: 12.9 GM/DL (ref 12.5–16)
MCH RBC QN AUTO: 29.9 PG (ref 27–31)
MCHC RBC AUTO-ENTMCNC: 32 % (ref 32–36)
MCV RBC AUTO: 93.5 FL (ref 78–100)
PDW BLD-RTO: 14 % (ref 11.7–14.9)
PLATELET # BLD: 147 K/CU MM (ref 140–440)
PMV BLD AUTO: 10.2 FL (ref 7.5–11.1)
POTASSIUM SERPL-SCNC: 4.1 MMOL/L (ref 3.5–5.1)
RBC # BLD: 4.31 M/CU MM (ref 4.2–5.4)
SODIUM BLD-SCNC: 140 MMOL/L (ref 135–145)
TOTAL PROTEIN: 6.3 GM/DL (ref 6.4–8.2)
TSH HIGH SENSITIVITY: 4.25 UIU/ML (ref 0.27–4.2)
WBC # BLD: 6 K/CU MM (ref 4–10.5)

## 2021-12-03 PROCEDURE — 85027 COMPLETE CBC AUTOMATED: CPT

## 2021-12-03 PROCEDURE — 86140 C-REACTIVE PROTEIN: CPT

## 2021-12-03 PROCEDURE — 36415 COLL VENOUS BLD VENIPUNCTURE: CPT

## 2021-12-03 PROCEDURE — 85652 RBC SED RATE AUTOMATED: CPT

## 2021-12-03 PROCEDURE — 84443 ASSAY THYROID STIM HORMONE: CPT

## 2021-12-03 PROCEDURE — 80053 COMPREHEN METABOLIC PANEL: CPT

## 2021-12-10 ENCOUNTER — HOSPITAL ENCOUNTER (INPATIENT)
Age: 86
LOS: 2 days | Discharge: HOME | DRG: 282 | End: 2021-12-12
Attending: EMERGENCY MEDICINE | Admitting: HOSPITALIST
Payer: MEDICARE

## 2021-12-10 ENCOUNTER — APPOINTMENT (OUTPATIENT)
Dept: GENERAL RADIOLOGY | Age: 86
DRG: 282 | End: 2021-12-10
Payer: MEDICARE

## 2021-12-10 DIAGNOSIS — R06.89 DYSPNEA AND RESPIRATORY ABNORMALITIES: ICD-10-CM

## 2021-12-10 DIAGNOSIS — R77.8 TROPONIN LEVEL ELEVATED: ICD-10-CM

## 2021-12-10 DIAGNOSIS — R79.89 ELEVATED BRAIN NATRIURETIC PEPTIDE (BNP) LEVEL: ICD-10-CM

## 2021-12-10 DIAGNOSIS — R07.9 CHEST PAIN, UNSPECIFIED TYPE: Primary | ICD-10-CM

## 2021-12-10 DIAGNOSIS — R06.00 DYSPNEA AND RESPIRATORY ABNORMALITIES: ICD-10-CM

## 2021-12-10 DIAGNOSIS — R11.0 NAUSEA: ICD-10-CM

## 2021-12-10 DIAGNOSIS — I21.4 NSTEMI (NON-ST ELEVATED MYOCARDIAL INFARCTION) (HCC): ICD-10-CM

## 2021-12-10 PROBLEM — R06.02 SOB (SHORTNESS OF BREATH): Status: ACTIVE | Noted: 2021-12-10

## 2021-12-10 LAB
ALBUMIN SERPL-MCNC: 3.9 GM/DL (ref 3.4–5)
ALP BLD-CCNC: 87 IU/L (ref 40–129)
ALT SERPL-CCNC: 10 U/L (ref 10–40)
ANION GAP SERPL CALCULATED.3IONS-SCNC: 7 MMOL/L (ref 4–16)
APTT: 23 SECONDS (ref 25.1–37.1)
APTT: 56.4 SECONDS (ref 25.1–37.1)
AST SERPL-CCNC: 18 IU/L (ref 15–37)
BACTERIA: ABNORMAL /HPF
BASOPHILS ABSOLUTE: 0 K/CU MM
BASOPHILS RELATIVE PERCENT: 0.8 % (ref 0–1)
BILIRUB SERPL-MCNC: 0.6 MG/DL (ref 0–1)
BILIRUBIN URINE: NEGATIVE MG/DL
BLOOD, URINE: ABNORMAL
BUN BLDV-MCNC: 16 MG/DL (ref 6–23)
CALCIUM SERPL-MCNC: 9.2 MG/DL (ref 8.3–10.6)
CHLORIDE BLD-SCNC: 104 MMOL/L (ref 99–110)
CLARITY: CLEAR
CO2: 25 MMOL/L (ref 21–32)
COLOR: ABNORMAL
CREAT SERPL-MCNC: 1 MG/DL (ref 0.6–1.1)
DIFFERENTIAL TYPE: ABNORMAL
EKG ATRIAL RATE: 59 BPM
EKG DIAGNOSIS: NORMAL
EKG P AXIS: 40 DEGREES
EKG P-R INTERVAL: 174 MS
EKG Q-T INTERVAL: 432 MS
EKG QRS DURATION: 88 MS
EKG QTC CALCULATION (BAZETT): 427 MS
EKG R AXIS: -25 DEGREES
EKG T AXIS: -13 DEGREES
EKG VENTRICULAR RATE: 59 BPM
EOSINOPHILS ABSOLUTE: 0 K/CU MM
EOSINOPHILS RELATIVE PERCENT: 0 % (ref 0–3)
GFR AFRICAN AMERICAN: >60 ML/MIN/1.73M2
GFR NON-AFRICAN AMERICAN: 51 ML/MIN/1.73M2
GLUCOSE BLD-MCNC: 106 MG/DL (ref 70–99)
GLUCOSE, URINE: NEGATIVE MG/DL
HCT VFR BLD CALC: 39 % (ref 37–47)
HCT VFR BLD CALC: 39.9 % (ref 37–47)
HEMOGLOBIN: 12.5 GM/DL (ref 12.5–16)
HEMOGLOBIN: 12.7 GM/DL (ref 12.5–16)
IMMATURE NEUTROPHIL %: 0.2 % (ref 0–0.43)
KETONES, URINE: NEGATIVE MG/DL
LEUKOCYTE ESTERASE, URINE: ABNORMAL
LIPASE: 31 IU/L (ref 13–60)
LV EF: 53 %
LVEF MODALITY: NORMAL
LYMPHOCYTES ABSOLUTE: 0.7 K/CU MM
LYMPHOCYTES RELATIVE PERCENT: 13.1 % (ref 24–44)
MCH RBC QN AUTO: 29.7 PG (ref 27–31)
MCH RBC QN AUTO: 29.9 PG (ref 27–31)
MCHC RBC AUTO-ENTMCNC: 31.8 % (ref 32–36)
MCHC RBC AUTO-ENTMCNC: 32.1 % (ref 32–36)
MCV RBC AUTO: 93.2 FL (ref 78–100)
MCV RBC AUTO: 93.3 FL (ref 78–100)
MONOCYTES ABSOLUTE: 0.4 K/CU MM
MONOCYTES RELATIVE PERCENT: 8.4 % (ref 0–4)
MUCUS: ABNORMAL HPF
NITRITE URINE, QUANTITATIVE: NEGATIVE
NUCLEATED RBC %: 0 %
PDW BLD-RTO: 13.6 % (ref 11.7–14.9)
PDW BLD-RTO: 13.7 % (ref 11.7–14.9)
PH, URINE: 7 (ref 5–8)
PLATELET # BLD: 145 K/CU MM (ref 140–440)
PLATELET # BLD: 174 K/CU MM (ref 140–440)
PMV BLD AUTO: 10 FL (ref 7.5–11.1)
PMV BLD AUTO: 9.8 FL (ref 7.5–11.1)
POTASSIUM SERPL-SCNC: 4.2 MMOL/L (ref 3.5–5.1)
PRO-BNP: 2426 PG/ML
PROTEIN UA: NEGATIVE MG/DL
RBC # BLD: 4.18 M/CU MM (ref 4.2–5.4)
RBC # BLD: 4.28 M/CU MM (ref 4.2–5.4)
RBC URINE: 1 /HPF (ref 0–6)
SEGMENTED NEUTROPHILS ABSOLUTE COUNT: 3.9 K/CU MM
SEGMENTED NEUTROPHILS RELATIVE PERCENT: 77.5 % (ref 36–66)
SODIUM BLD-SCNC: 136 MMOL/L (ref 135–145)
SPECIFIC GRAVITY UA: 1 (ref 1–1.03)
SQUAMOUS EPITHELIAL: <1 /HPF
TOTAL CK: 53 IU/L (ref 26–140)
TOTAL IMMATURE NEUTOROPHIL: 0.01 K/CU MM
TOTAL NUCLEATED RBC: 0 K/CU MM
TOTAL PROTEIN: 6.3 GM/DL (ref 6.4–8.2)
TRICHOMONAS: ABNORMAL /HPF
TROPONIN T: 0.21 NG/ML
TSH HIGH SENSITIVITY: 3.27 UIU/ML (ref 0.27–4.2)
UROBILINOGEN, URINE: NEGATIVE MG/DL (ref 0.2–1)
WBC # BLD: 5 K/CU MM (ref 4–10.5)
WBC # BLD: 5.4 K/CU MM (ref 4–10.5)
WBC UA: 1 /HPF (ref 0–5)

## 2021-12-10 PROCEDURE — 84443 ASSAY THYROID STIM HORMONE: CPT

## 2021-12-10 PROCEDURE — 84484 ASSAY OF TROPONIN QUANT: CPT

## 2021-12-10 PROCEDURE — 99285 EMERGENCY DEPT VISIT HI MDM: CPT

## 2021-12-10 PROCEDURE — 6360000002 HC RX W HCPCS: Performed by: EMERGENCY MEDICINE

## 2021-12-10 PROCEDURE — 93005 ELECTROCARDIOGRAM TRACING: CPT | Performed by: EMERGENCY MEDICINE

## 2021-12-10 PROCEDURE — 85027 COMPLETE CBC AUTOMATED: CPT

## 2021-12-10 PROCEDURE — 6370000000 HC RX 637 (ALT 250 FOR IP): Performed by: NURSE PRACTITIONER

## 2021-12-10 PROCEDURE — 96375 TX/PRO/DX INJ NEW DRUG ADDON: CPT

## 2021-12-10 PROCEDURE — 96374 THER/PROPH/DIAG INJ IV PUSH: CPT

## 2021-12-10 PROCEDURE — 85730 THROMBOPLASTIN TIME PARTIAL: CPT

## 2021-12-10 PROCEDURE — 2060000000 HC ICU INTERMEDIATE R&B

## 2021-12-10 PROCEDURE — 85025 COMPLETE CBC W/AUTO DIFF WBC: CPT

## 2021-12-10 PROCEDURE — 93306 TTE W/DOPPLER COMPLETE: CPT

## 2021-12-10 PROCEDURE — 82550 ASSAY OF CK (CPK): CPT

## 2021-12-10 PROCEDURE — 2500000003 HC RX 250 WO HCPCS: Performed by: EMERGENCY MEDICINE

## 2021-12-10 PROCEDURE — 81001 URINALYSIS AUTO W/SCOPE: CPT

## 2021-12-10 PROCEDURE — 93010 ELECTROCARDIOGRAM REPORT: CPT | Performed by: INTERNAL MEDICINE

## 2021-12-10 PROCEDURE — 83880 ASSAY OF NATRIURETIC PEPTIDE: CPT

## 2021-12-10 PROCEDURE — 85610 PROTHROMBIN TIME: CPT

## 2021-12-10 PROCEDURE — 80053 COMPREHEN METABOLIC PANEL: CPT

## 2021-12-10 PROCEDURE — 71045 X-RAY EXAM CHEST 1 VIEW: CPT

## 2021-12-10 PROCEDURE — 99223 1ST HOSP IP/OBS HIGH 75: CPT | Performed by: INTERNAL MEDICINE

## 2021-12-10 PROCEDURE — 6370000000 HC RX 637 (ALT 250 FOR IP): Performed by: EMERGENCY MEDICINE

## 2021-12-10 PROCEDURE — 83690 ASSAY OF LIPASE: CPT

## 2021-12-10 RX ORDER — ONDANSETRON 4 MG/1
4 TABLET, ORALLY DISINTEGRATING ORAL EVERY 8 HOURS PRN
Status: DISCONTINUED | OUTPATIENT
Start: 2021-12-10 | End: 2021-12-12 | Stop reason: HOSPADM

## 2021-12-10 RX ORDER — MORPHINE SULFATE 2 MG/ML
1 INJECTION, SOLUTION INTRAMUSCULAR; INTRAVENOUS EVERY 30 MIN PRN
Status: DISCONTINUED | OUTPATIENT
Start: 2021-12-10 | End: 2021-12-12 | Stop reason: HOSPADM

## 2021-12-10 RX ORDER — SODIUM CHLORIDE 9 MG/ML
25 INJECTION, SOLUTION INTRAVENOUS PRN
Status: DISCONTINUED | OUTPATIENT
Start: 2021-12-10 | End: 2021-12-12 | Stop reason: HOSPADM

## 2021-12-10 RX ORDER — HEPARIN SODIUM 1000 [USP'U]/ML
60 INJECTION, SOLUTION INTRAVENOUS; SUBCUTANEOUS ONCE
Status: COMPLETED | OUTPATIENT
Start: 2021-12-10 | End: 2021-12-10

## 2021-12-10 RX ORDER — SODIUM CHLORIDE 0.9 % (FLUSH) 0.9 %
5-40 SYRINGE (ML) INJECTION EVERY 12 HOURS SCHEDULED
Status: DISCONTINUED | OUTPATIENT
Start: 2021-12-10 | End: 2021-12-12 | Stop reason: HOSPADM

## 2021-12-10 RX ORDER — MORPHINE SULFATE 2 MG/ML
2 INJECTION, SOLUTION INTRAMUSCULAR; INTRAVENOUS EVERY 30 MIN PRN
Status: DISCONTINUED | OUTPATIENT
Start: 2021-12-10 | End: 2021-12-10

## 2021-12-10 RX ORDER — FAMOTIDINE 20 MG/1
20 TABLET, FILM COATED ORAL DAILY
Status: DISCONTINUED | OUTPATIENT
Start: 2021-12-10 | End: 2021-12-12 | Stop reason: HOSPADM

## 2021-12-10 RX ORDER — ACETAMINOPHEN 650 MG/1
650 SUPPOSITORY RECTAL EVERY 6 HOURS PRN
Status: DISCONTINUED | OUTPATIENT
Start: 2021-12-10 | End: 2021-12-12 | Stop reason: HOSPADM

## 2021-12-10 RX ORDER — ATORVASTATIN CALCIUM 40 MG/1
80 TABLET, FILM COATED ORAL NIGHTLY
Status: DISCONTINUED | OUTPATIENT
Start: 2021-12-10 | End: 2021-12-12 | Stop reason: HOSPADM

## 2021-12-10 RX ORDER — ACETAMINOPHEN 325 MG/1
650 TABLET ORAL EVERY 6 HOURS PRN
Status: DISCONTINUED | OUTPATIENT
Start: 2021-12-10 | End: 2021-12-12 | Stop reason: HOSPADM

## 2021-12-10 RX ORDER — ASPIRIN 81 MG/1
81 TABLET, CHEWABLE ORAL DAILY
Status: DISCONTINUED | OUTPATIENT
Start: 2021-12-11 | End: 2021-12-12 | Stop reason: HOSPADM

## 2021-12-10 RX ORDER — ASPIRIN 81 MG/1
162 TABLET, CHEWABLE ORAL ONCE
Status: COMPLETED | OUTPATIENT
Start: 2021-12-10 | End: 2021-12-10

## 2021-12-10 RX ORDER — NITROGLYCERIN 0.4 MG/1
0.4 TABLET SUBLINGUAL EVERY 5 MIN PRN
Status: DISCONTINUED | OUTPATIENT
Start: 2021-12-10 | End: 2021-12-12 | Stop reason: HOSPADM

## 2021-12-10 RX ORDER — HEPARIN SODIUM 1000 [USP'U]/ML
30 INJECTION, SOLUTION INTRAVENOUS; SUBCUTANEOUS PRN
Status: DISCONTINUED | OUTPATIENT
Start: 2021-12-10 | End: 2021-12-11

## 2021-12-10 RX ORDER — HEPARIN SODIUM 10000 [USP'U]/100ML
5-30 INJECTION, SOLUTION INTRAVENOUS CONTINUOUS
Status: DISCONTINUED | OUTPATIENT
Start: 2021-12-10 | End: 2021-12-11

## 2021-12-10 RX ORDER — SODIUM CHLORIDE 0.9 % (FLUSH) 0.9 %
5-40 SYRINGE (ML) INJECTION PRN
Status: DISCONTINUED | OUTPATIENT
Start: 2021-12-10 | End: 2021-12-12 | Stop reason: HOSPADM

## 2021-12-10 RX ORDER — POLYETHYLENE GLYCOL 3350 17 G/17G
17 POWDER, FOR SOLUTION ORAL DAILY PRN
Status: DISCONTINUED | OUTPATIENT
Start: 2021-12-10 | End: 2021-12-12 | Stop reason: HOSPADM

## 2021-12-10 RX ORDER — METOPROLOL TARTRATE 50 MG/1
50 TABLET, FILM COATED ORAL 2 TIMES DAILY
Status: DISCONTINUED | OUTPATIENT
Start: 2021-12-10 | End: 2021-12-12 | Stop reason: HOSPADM

## 2021-12-10 RX ORDER — MORPHINE SULFATE/0.9% NACL/PF 1 MG/ML
1 SYRINGE (ML) INJECTION EVERY 30 MIN PRN
Status: DISCONTINUED | OUTPATIENT
Start: 2021-12-10 | End: 2021-12-10

## 2021-12-10 RX ORDER — ONDANSETRON 2 MG/ML
4 INJECTION INTRAMUSCULAR; INTRAVENOUS EVERY 30 MIN PRN
Status: DISCONTINUED | OUTPATIENT
Start: 2021-12-10 | End: 2021-12-12 | Stop reason: HOSPADM

## 2021-12-10 RX ORDER — HEPARIN SODIUM 1000 [USP'U]/ML
60 INJECTION, SOLUTION INTRAVENOUS; SUBCUTANEOUS PRN
Status: DISCONTINUED | OUTPATIENT
Start: 2021-12-10 | End: 2021-12-11

## 2021-12-10 RX ORDER — MORPHINE SULFATE 2 MG/ML
1 INJECTION, SOLUTION INTRAMUSCULAR; INTRAVENOUS ONCE
Status: DISCONTINUED | OUTPATIENT
Start: 2021-12-10 | End: 2021-12-11

## 2021-12-10 RX ORDER — ONDANSETRON 2 MG/ML
4 INJECTION INTRAMUSCULAR; INTRAVENOUS EVERY 6 HOURS PRN
Status: DISCONTINUED | OUTPATIENT
Start: 2021-12-10 | End: 2021-12-12 | Stop reason: HOSPADM

## 2021-12-10 RX ADMIN — ONDANSETRON 4 MG: 2 INJECTION INTRAMUSCULAR; INTRAVENOUS at 11:35

## 2021-12-10 RX ADMIN — HEPARIN SODIUM 4360 UNITS: 1000 INJECTION INTRAVENOUS; SUBCUTANEOUS at 13:41

## 2021-12-10 RX ADMIN — FAMOTIDINE 20 MG: 10 INJECTION, SOLUTION INTRAVENOUS at 11:34

## 2021-12-10 RX ADMIN — ASPIRIN 162 MG: 81 TABLET, CHEWABLE ORAL at 11:35

## 2021-12-10 RX ADMIN — METOPROLOL TARTRATE 50 MG: 50 TABLET, FILM COATED ORAL at 22:03

## 2021-12-10 RX ADMIN — ATORVASTATIN CALCIUM 80 MG: 40 TABLET, FILM COATED ORAL at 22:03

## 2021-12-10 RX ADMIN — HEPARIN SODIUM AND DEXTROSE 12 UNITS/KG/HR: 10000; 5 INJECTION INTRAVENOUS at 13:33

## 2021-12-10 ASSESSMENT — ENCOUNTER SYMPTOMS
SHORTNESS OF BREATH: 1
ALLERGIC/IMMUNOLOGIC NEGATIVE: 1
CHEST TIGHTNESS: 1
NAUSEA: 1
EYES NEGATIVE: 1

## 2021-12-10 NOTE — H&P
History and Physical      Name:  Wero Reinoso /Age/Sex: 1925  (80 y.o. female)   MRN & CSN:  [id number] & [id number] Admission Date/Time: 12/10/2021 10:58 AM   Location:  [facility] PCP: Jamie Hawk MD       Hospital Day: 1           Assessment and Plan:   # NSTEMI with chest pain -? Prior history of CAD. Cardiology consulted in ED. recommending heparin drip. Monitor on telemetry. Continue to trend troponins, obtain TTE, check TSH, manage on aspirin statin, beta-blocker, nitrates supplemental oxygenation as needed. Patient is currently chest pain-free. # Essential hypertension -Blood pressure currently stable resume beta-blocker  # Mixed hyperlipidemia - resume statin therapy  # CKD stage II/III -appears renal function is stable creatinine baseline 1-1.2. Avoid nephrotoxins, NSAIDs, renal dose medications for current GFR, keep MAP greater than 65, RFP in a.m. Present on Admission:   NSTEMI (non-ST elevated myocardial infarction) ([facility] [address]             Diet No diet orders on file   DVT Prophylaxis [] Lovenox, [x]  Heparin, [] SCDs, [] Ambulation  [] Long term AC   GI Prophylaxis [] PPI,  [x] H2 Blocker,  [] Carafate,  [] Diet/Tube Feeds   Code Status  DNR CCA   Disposition Admit to inpatient [facility]    patient plans to return home upon discharge         Chief Complaint: Nausea      History obtained from EHR patient and daughter (POA)  History of Present Illness: Wero Reinoso is a 80 y.o. female  with history of ? CAD (has hx of cardiac cath, report not available), HTN, hyperlipidemia, CKD stage II/III who presents with chest pain. The patient reports sudden onset midsternal nonradiating chest pain which was constant in nature with associated shortness of breath and nausea. Chest pain occurred at rest.  She denies aggravating or relieving factors at the time. Patient also reports exertional shortness of breath and fatigue over the past week.   She denies fever chills recent infections or cough. She denies prior history of these type symptoms. Patient was brought to the hospital via EMS. She was afebrile, pulse 55 respirations 14 blood pressure 135/59 with O2 sat 95% on room air. Chest x-ray performed was non acute, + cardiomegaly. EKG showed sinus bradycardia, rate 59 Q waves present no acute ST changes. Troponin 0.208. BNP 2426. Chemistry panel showed glucose 106, stable renal function with GFR 51 otherwise unremarkable. LFTs within normal limits. CBC unremarkable. Urinalysis reviewed. She was given 162 mg aspirin and morphine IV which improved her symptoms. Cardiology was consulted and recommended heparin drip. Patient has been admitted for further management. Ten point ROS: reviewed and are negative, unless as noted in above HPI. Objective:   No intake or output data in the 24 hours ending 12/10/21 1311     Vitals:   Vitals:    12/10/21 1147 12/10/21 1149 12/10/21 1150 12/10/21 1253   BP:  (!) 133/53     Pulse:   59    Resp:   18    Temp: 98.2 °F (36.8 °C)      TempSrc: Oral      SpO2:   99%    Weight:    160 lb (72.6 kg)       Physical Exam: 12/10/21     GEN -Awake alert appearing female, in NAD. Appears given age. EYES -anicteric, conjunctiva pink. HENT -Head is normocephalic, atraumatic. MM are moist. Oral pharynx without exudates, no evidence of thrush. NECK -Supple, no apparent thyromegaly or masses. RESP -CTA, no wheezes, rales or rhonchi. Symmetric chest movement   C/V -S1/S2 auscultated. RRR without appreciable M/R/G. No JVD. Cap refill <3 sec. No peripheral edema. GI -Abdomen is soft non distended, non tender to palpation. + BS. No masses or guarding.  -No CVA/ flank tenderness. Davis catheter is not present. LYMPH- No petechiae or ecchymoses. MS -No gross joint deformities. SKIN -Normal coloration, warm, dry. NEURO-Cranial nerves appear grossly intact, normal speech, no lateralizing weakness.   PSYC-Awake, alert, oriented x2, reorients to time . Appropriate affect. Past Medical History:      Past Medical History:   Diagnosis Date    Hyperlipidemia     Hypertension        Past Surgical  History:    has a past surgical history that includes fracture surgery; Hysterectomy; and Appendectomy. Family  History:   family history is not on file. Father-   Mother -     Social History:     Social History     Socioeconomic History    Marital status:      Spouse name: None    Number of children: None    Years of education: None    Highest education level: None   Occupational History    None   Tobacco Use    Smoking status: Never Smoker    Smokeless tobacco: Never Used   Substance and Sexual Activity    Alcohol use: Yes     Comment: q. six months    Drug use: No    Sexual activity: None   Other Topics Concern    None   Social History Narrative    None     Social Determinants of Health     Financial Resource Strain:     Difficulty of Paying Living Expenses: Not on file   Food Insecurity:     Worried About Running Out of Food in the Last Year: Not on file    Darlene of Food in the Last Year: Not on file   Transportation Needs:     Lack of Transportation (Medical): Not on file    Lack of Transportation (Non-Medical):  Not on file   Physical Activity:     Days of Exercise per Week: Not on file    Minutes of Exercise per Session: Not on file   Stress:     Feeling of Stress : Not on file   Social Connections:     Frequency of Communication with Friends and Family: Not on file    Frequency of Social Gatherings with Friends and Family: Not on file    Attends [employer] Services: Not on file    Active Member of Clubs or Organizations: Not on file    Attends Club or Organization Meetings: Not on file    Marital Status: Not on file   Intimate Partner Violence:     Fear of Current or Ex-Partner: Not on file    Emotionally Abused: Not on file    Physically Abused: Not on file    Sexually Abused: Not on file Housing Stability:     Unable to Pay for Housing in the Last Year: Not on file    Number of Places Lived in the Last Year: Not on file    Unstable Housing in the Last Year: Not on file      reports that she has never smoked. She has never used smokeless tobacco.   reports current alcohol use. reports no history of drug use. Allergies:   No Known Allergies    Home Medications:     Prior to Admission medications    Medication Sig Start Date End Date Taking? Authorizing Provider   tiZANidine (ZANAFLEX) 2 MG tablet Take 1 tablet by mouth every 12 hours as needed (spasm) 12/2/21   Stephanie Rosas, APRN - CNP   lovastatin (MEVACOR) 40 MG tablet Take 40 mg by mouth daily    Historical Provider, MD   metoprolol (LOPRESSOR) 50 MG tablet Take 50 mg by mouth 2 times daily    Historical Provider, MD   aspirin 81 MG chewable tablet Take 81 mg by mouth daily    Historical Provider, MD         Medications:   Medications:    morphine  1 mg IntraVENous Once    heparin (porcine)  60 Units/kg IntraVENous Once      Infusions:    heparin (PORCINE) Infusion       PRN Meds: ondansetron, 4 mg, Q30 Min PRN  morphine, 1 mg, Q30 Min PRN  heparin (porcine), 60 Units/kg, PRN  heparin (porcine), 30 Units/kg, PRN        Data:     Laboratory this visit:  Reviewed  Recent Labs     12/10/21  1127   WBC 5.0   HGB 12.5   HCT 39.0         Recent Labs     12/10/21  1127      K 4.2      CO2 25   BUN 16   CREATININE 1.0     Recent Labs     12/10/21  1127   AST 18   ALT 10   BILITOT 0.6   ALKPHOS 87     No results for input(s): INR in the last 72 hours. Radiology this visit:  Reviewed. XR THORACIC SPINE (2 VIEWS)    Result Date: 12/2/2021  EXAMINATION: 3 XRAY VIEWS OF THE THORACIC SPINE 12/2/2021 2:05 pm COMPARISON: None.  HISTORY: ORDERING SYSTEM PROVIDED HISTORY: pain, no truam TECHNOLOGIST PROVIDED HISTORY: Reason for exam:->pain, no truam Reason for Exam: pain, no trauma FINDINGS: Thoracic vertebral bodies are normal in height and alignment. Mild multilevel degenerative changes. See lumbar spine report for discussion of L1 findings. No evidence of fracture. Pedicles are symmetric and intact. Visualized lungs are clear. Mild multilevel degenerative changes of the thoracic spine. No acute focal abnormality of the thoracic spine. XR LUMBAR SPINE (2-3 VIEWS)    Result Date: 12/2/2021  EXAMINATION: THREE XRAY VIEWS OF THE LUMBAR SPINE 12/2/2021 2:06 pm COMPARISON: 08/21/2017 HISTORY: ORDERING SYSTEM PROVIDED HISTORY: pain, no trauma TECHNOLOGIST PROVIDED HISTORY: Reason for exam:->pain, no trauma Reason for Exam: pain, no trauma FINDINGS: There is mild loss of height of the L1 vertebra with slight irregularity along the superior endplate suggesting a compression deformity. When compared to prior radiographs the superior endplate irregularity was present on prior although there is progressive loss of height. Findings suggest a chronic progressive compression fracture of L1. Moderate multilevel disc degenerative changes are noted throughout the lumbar spine with bilateral facet arthritis. No evidence of focal malalignment. Likely chronic L1 superior endplate compression fracture with approximately 20% loss of vertebral body height. Moderate multilevel disc degenerative changes. XR HUMERUS RIGHT (MIN 2 VIEWS)    Result Date: 12/2/2021  EXAMINATION: TWO XRAY VIEWS OF THE RIGHT HUMERUS 12/2/2021 2:05 pm COMPARISON: None. HISTORY: ORDERING SYSTEM PROVIDED HISTORY: severe pain, no trauma TECHNOLOGIST PROVIDED HISTORY: Reason for exam:->severe pain, no trauma Reason for Exam: severe pain, no trauma FINDINGS: No acute fracture or dislocation of the right humerus. No focal osseous lesion. No cortical destruction. There are is mild AC joint osteoarthritis. Glenohumeral joint demonstrates normal alignment. No acute abnormality of the humerus.      XR HIP LEFT (2-3 VIEWS)    Result Date: 12/2/2021  EXAMINATION: 2 XRAY VIEWS OF THE LEFT HIP, 1 XRAY VIEW OF THE PELVIS AND 2 XRAY VIEWS RIGHT HIP 12/2/2021 2:06 pm COMPARISON: None HISTORY: ORDERING SYSTEM PROVIDED HISTORY: no trauma, pain TECHNOLOGIST PROVIDED HISTORY: Reason for exam:->no trauma, pain Reason for Exam: no trauma, pain FINDINGS: Mild bilateral hip osteoarthritis with mild joint space narrowing. No acute fracture or dislocation of either hip. The pelvic ring is intact. Mild bilateral SI joint osteoarthritis. Mild bilateral SI joint and bilateral hip osteoarthritis. No acute fracture or dislocation. XR CHEST PORTABLE    Result Date: 12/10/2021  EXAMINATION: ONE XRAY VIEW OF THE CHEST 12/10/2021 11:06 am COMPARISON: None. HISTORY: ORDERING SYSTEM PROVIDED HISTORY: CP/Nausea TECHNOLOGIST PROVIDED HISTORY: Reason for exam:->CP/Nausea Reason for Exam: CP/Nausea FINDINGS: The heart size is enlarged. The pulmonary vasculature is within normal limits. No acute infiltrates are seen. There are scattered calcified granulomas. No pneumothoraces are seen. 1. No active pulmonary disease. 2. Cardiomegaly without overt failure. XR HIP 2-3 VW W PELVIS RIGHT    Result Date: 12/2/2021  EXAMINATION: 2 XRAY VIEWS OF THE LEFT HIP, 1 XRAY VIEW OF THE PELVIS AND 2 XRAY VIEWS RIGHT HIP 12/2/2021 2:06 pm COMPARISON: None HISTORY: ORDERING SYSTEM PROVIDED HISTORY: no trauma, pain TECHNOLOGIST PROVIDED HISTORY: Reason for exam:->no trauma, pain Reason for Exam: no trauma, pain FINDINGS: Mild bilateral hip osteoarthritis with mild joint space narrowing. No acute fracture or dislocation of either hip. The pelvic ring is intact. Mild bilateral SI joint osteoarthritis. Mild bilateral SI joint and bilateral hip osteoarthritis. No acute fracture or dislocation.            EKG this visit:  Reviewed         Electronically signed by NAEEM Gold CNP on 12/10/2021 at 1:11 PM

## 2021-12-10 NOTE — ED PROVIDER NOTES
[facility]      TRIAGE CHIEF COMPLAINT:   Nausea      [employer]:  Moon Castaneda is a 80 y.o. female that presents by EMS with complaint of nausea chest pain short of breath. Patient states symptoms started this morning. Denies history of heart problems. She took 1 baby aspirin this morning, EMS and negative nitro or aspirin. States just chest tightness pressure short of breath nausea. Denies any fevers cough abdominal pain no history of DVT PE or AAA. No other questions or concerns. REVIEW OF SYSTEMS:  At least 10 systems reviewed and otherwise acutely negative except as in the [address] Review of Systems   Constitutional: Positive for fatigue. HENT: Negative. Eyes: Negative. Respiratory: Positive for chest tightness and shortness of breath. Cardiovascular: Positive for chest pain. Gastrointestinal: Positive for nausea. Endocrine: Negative. Genitourinary: Negative. Musculoskeletal: Negative. Skin: Negative. Allergic/Immunologic: Negative. Neurological: Negative. Hematological: Negative. Psychiatric/Behavioral: Negative. All other systems reviewed and are negative. Past Medical History:   Diagnosis Date    Hyperlipidemia     Hypertension      Past Surgical History:   Procedure Laterality Date    APPENDECTOMY      FRACTURE SURGERY      HYSTERECTOMY       History reviewed. No pertinent family history. Social History     Socioeconomic History    Marital status:       Spouse name: Not on file    Number of children: Not on file    Years of education: Not on file    Highest education level: Not on file   Occupational History    Not on file   Tobacco Use    Smoking status: Never Smoker    Smokeless tobacco: Never Used   Substance and Sexual Activity    Alcohol use: Yes     Comment: q. six months    Drug use: No    Sexual activity: Not on file   Other Topics Concern    Not on file   Social History Narrative    Not on file Social Determinants of Health     Financial Resource Strain:     Difficulty of Paying Living Expenses: Not on file   Food Insecurity:     Worried About Running Out of Food in the Last Year: Not on file    Darlene of Food in the Last Year: Not on file   Transportation Needs:     Lack of Transportation (Medical): Not on file    Lack of Transportation (Non-Medical):  Not on file   Physical Activity:     Days of Exercise per Week: Not on file    Minutes of Exercise per Session: Not on file   Stress:     Feeling of Stress : Not on file   Social Connections:     Frequency of Communication with Friends and Family: Not on file    Frequency of Social Gatherings with Friends and Family: Not on file    Attends Orthodox Services: Not on file    Active Member of [address] [place] [address] [employer] or Organizations: Not on file    Attends Club or Organization Meetings: Not on file    Marital Status: Not on file   Intimate Partner Violence:     Fear of Current or Ex-Partner: Not on file    Emotionally Abused: Not on file    Physically Abused: Not on file    Sexually Abused: Not on file   Housing Stability:     Unable to Pay for Housing in the Last Year: Not on file    Number of [place] in the Last Year: Not on file    Unstable Housing in the Last Year: Not on file     Current Facility-Administered Medications   Medication Dose Route Frequency Provider Last Rate Last Admin    ondansetron (ZOFRAN) injection 4 mg  4 mg IntraVENous Q30 Min PRN [employer], DO   4 mg at 12/10/21 1135    morphine injection 1 mg  1 mg IntraVENous Q30 Min PRN [employer], DO        morphine injection 1 mg  1 mg IntraVENous Once [employer], DO        heparin (porcine) injection 60 Units/kg  60 Units/kg IntraVENous Once [employer], DO        heparin (porcine) injection 60 Units/kg  60 Units/kg IntraVENous PRN [employer], DO        heparin (porcine) injection 30 Units/kg  30 Units/kg IntraVENous PRN [employer], DO       Maggy Savage heparin 25,000 units in dextrose 5% 250 mL (premix) infusion  5-30 Units/kg/hr IntraVENous Continuous Rocio Moncada, DO         Current Outpatient Medications   Medication Sig Dispense Refill    tiZANidine (ZANAFLEX) 2 MG tablet Take 1 tablet by mouth every 12 hours as needed (spasm) 8 tablet 0    lovastatin (MEVACOR) 40 MG tablet Take 40 mg by mouth daily      metoprolol (LOPRESSOR) 50 MG tablet Take 50 mg by mouth 2 times daily      aspirin 81 MG chewable tablet Take 81 mg by mouth daily        No Known Allergies  Current Facility-Administered Medications   Medication Dose Route Frequency Provider Last Rate Last Admin    ondansetron (ZOFRAN) injection 4 mg  4 mg IntraVENous Q30 Min PRN Rocio Moncada, DO   4 mg at 12/10/21 1135    morphine injection 1 mg  1 mg IntraVENous Q30 Min PRN Rocio Moncada, DO        morphine injection 1 mg  1 mg IntraVENous Once Rocio Moncada, DO        heparin (porcine) injection 60 Units/kg  60 Units/kg IntraVENous Once Rocio Moncada, DO        heparin (porcine) injection 60 Units/kg  60 Units/kg IntraVENous PRN Rocio Moncada, DO        heparin (porcine) injection 30 Units/kg  30 Units/kg IntraVENous PRN Rocio Moncada, DO        heparin 25,000 units in dextrose 5% 250 mL (premix) infusion  5-30 Units/kg/hr IntraVENous Continuous Rocio Moncada, DO         Current Outpatient Medications   Medication Sig Dispense Refill    tiZANidine (ZANAFLEX) 2 MG tablet Take 1 tablet by mouth every 12 hours as needed (spasm) 8 tablet 0    lovastatin (MEVACOR) 40 MG tablet Take 40 mg by mouth daily      metoprolol (LOPRESSOR) 50 MG tablet Take 50 mg by mouth 2 times daily      aspirin 81 MG chewable tablet Take 81 mg by mouth daily         Nursing Notes Reviewed    VITAL SIGNS:  ED Triage Vitals   Enc Vitals Group      BP       Pulse       Resp       Temp       Temp src       SpO2       Weight       Height       Head Circumference       Peak Flow       Pain Score       Pain Loc       Pain Edu? Excl. in [address]? PHYSICAL EXAM:  Physical Exam  Vitals and nursing note reviewed. Constitutional:       General: She is not in acute distress. Appearance: Normal appearance. She is well-developed, well-groomed and normal weight. She is not ill-appearing, toxic-appearing or diaphoretic. HENT:      Head: Normocephalic and atraumatic. Right Ear: External ear normal.      Left Ear: External ear normal.      Nose: No congestion or rhinorrhea. Eyes:      General: No scleral icterus. Right eye: No discharge. Left eye: No discharge. Extraocular Movements: Extraocular movements intact. Conjunctiva/sclera: Conjunctivae normal.   Neck:      Vascular: No JVD. Trachea: Phonation normal.   Cardiovascular:      Rate and Rhythm: Regular rhythm. Bradycardia present. Pulses: Normal pulses. Heart sounds: Normal heart sounds. No murmur heard. No friction rub. No gallop. Pulmonary:      Effort: Pulmonary effort is normal. No respiratory distress. Breath sounds: Normal breath sounds. No stridor. No wheezing, rhonchi or rales. Abdominal:      General: Bowel sounds are normal. There is no distension. Palpations: Abdomen is soft. There is no mass. Tenderness: There is no abdominal tenderness. There is no guarding or rebound. Negative signs include Collins's sign, Rovsing's sign and McBurney's sign. Hernia: No hernia is present. Musculoskeletal:         General: No swelling, tenderness, deformity or signs of injury. Normal range of motion. Cervical back: Full passive range of motion without pain and normal range of motion. No edema, erythema, signs of trauma, rigidity, torticollis or crepitus. No pain with movement. Normal range of motion. Right lower leg: No edema. Left lower leg: No edema. Skin:     General: Skin is warm. Coloration: Skin is not jaundiced or pale.       Findings: No bruising, erythema, lesion or rash. Neurological:      General: No focal deficit present. Mental Status: She is alert and oriented to person, place, and time. GCS: GCS eye subscore is 4. GCS verbal subscore is 5. GCS motor subscore is 6. Cranial Nerves: Cranial nerves are intact. No cranial nerve deficit, dysarthria or facial asymmetry. Sensory: Sensation is intact. No sensory deficit. Motor: Motor function is intact. No weakness, tremor, atrophy, abnormal muscle tone or seizure activity. Coordination: Coordination normal.   Psychiatric:         Mood and Affect: Mood normal.         Behavior: Behavior normal. Behavior is cooperative. Thought Content:  Thought content normal.         Judgment: Judgment normal.           I have reviewed andinterpreted all of the currently available lab results from this visit (if applicable):    Results for orders placed or performed during the hospital encounter of 12/10/21   CBC Auto Differential   Result Value Ref Range    WBC 5.0 4.0 - 10.5 K/CU MM    RBC 4.18 (L) 4.2 - 5.4 M/CU MM    Hemoglobin 12.5 12.5 - 16.0 GM/DL    Hematocrit 39.0 37 - 47 %    MCV 93.3 78 - 100 FL    MCH 29.9 27 - 31 PG    MCHC 32.1 32.0 - 36.0 %    RDW 13.6 11.7 - 14.9 %    Platelets 775 484 - 987 K/CU MM    MPV 9.8 7.5 - 11.1 FL    Differential Type AUTOMATED DIFFERENTIAL     Segs Relative 77.5 (H) 36 - 66 %    Lymphocytes % 13.1 (L) 24 - 44 %    Monocytes % 8.4 (H) 0 - 4 %    Eosinophils % 0.0 0 - 3 %    Basophils % 0.8 0 - 1 %    Segs Absolute 3.9 K/CU MM    Lymphocytes Absolute 0.7 K/CU MM    Monocytes Absolute 0.4 K/CU MM    Eosinophils Absolute 0.0 K/CU MM    Basophils Absolute 0.0 K/CU MM    Nucleated RBC % 0.0 %    Total Nucleated RBC 0.0 K/CU MM    Total Immature Neutrophil 0.01 K/CU MM    Immature Neutrophil % 0.2 0 - 0.43 %   CMP   Result Value Ref Range    Sodium 136 135 - 145 MMOL/L    Potassium 4.2 3.5 - 5.1 MMOL/L    Chloride 104 99 - 110 mMol/L    CO2 25 21 - 32 MMOL/L    BUN 16 obtained):  [] The following radiograph was interpreted by myself in the absence of a radiologist:  [x] Radiologist's Report Reviewed:    CXR    XR THORACIC SPINE (2 VIEWS)    Result Date: 12/2/2021  EXAMINATION: 3 XRAY VIEWS OF THE THORACIC SPINE 12/2/2021 2:05 pm COMPARISON: None. HISTORY: ORDERING SYSTEM PROVIDED HISTORY: pain, no truam TECHNOLOGIST PROVIDED HISTORY: Reason for exam:->pain, no truam Reason for Exam: pain, no trauma FINDINGS: Thoracic vertebral bodies are normal in height and alignment. Mild multilevel degenerative changes. See lumbar spine report for discussion of L1 findings. No evidence of fracture. Pedicles are symmetric and intact. Visualized lungs are clear. Mild multilevel degenerative changes of the thoracic spine. No acute focal abnormality of the thoracic spine. XR LUMBAR SPINE (2-3 VIEWS)    Result Date: 12/2/2021  EXAMINATION: THREE XRAY VIEWS OF THE LUMBAR SPINE 12/2/2021 2:06 pm COMPARISON: 08/21/2017 HISTORY: ORDERING SYSTEM PROVIDED HISTORY: pain, no trauma TECHNOLOGIST PROVIDED HISTORY: Reason for exam:->pain, no trauma Reason for Exam: pain, no trauma FINDINGS: There is mild loss of height of the L1 vertebra with slight irregularity along the superior endplate suggesting a compression deformity. When compared to prior radiographs the superior endplate irregularity was present on prior although there is progressive loss of height. Findings suggest a chronic progressive compression fracture of L1. Moderate multilevel disc degenerative changes are noted throughout the lumbar spine with bilateral facet arthritis. No evidence of focal malalignment. Likely chronic L1 superior endplate compression fracture with approximately 20% loss of vertebral body height. Moderate multilevel disc degenerative changes. XR HUMERUS RIGHT (MIN 2 VIEWS)    Result Date: 12/2/2021  EXAMINATION: TWO XRAY VIEWS OF THE RIGHT HUMERUS 12/2/2021 2:05 pm COMPARISON: None.  HISTORY: ORDERING SYSTEM PROVIDED HISTORY: severe pain, no trauma TECHNOLOGIST PROVIDED HISTORY: Reason for exam:->severe pain, no trauma Reason for Exam: severe pain, no trauma FINDINGS: No acute fracture or dislocation of the right humerus. No focal osseous lesion. No cortical destruction. There are is mild AC joint osteoarthritis. Glenohumeral joint demonstrates normal alignment. No acute abnormality of the humerus. XR HIP LEFT (2-3 VIEWS)    Result Date: 12/2/2021  EXAMINATION: 2 XRAY VIEWS OF THE LEFT HIP, 1 XRAY VIEW OF THE PELVIS AND 2 XRAY VIEWS RIGHT HIP 12/2/2021 2:06 pm COMPARISON: None HISTORY: ORDERING SYSTEM PROVIDED HISTORY: no trauma, pain TECHNOLOGIST PROVIDED HISTORY: Reason for exam:->no trauma, pain Reason for Exam: no trauma, pain FINDINGS: Mild bilateral hip osteoarthritis with mild joint space narrowing. No acute fracture or dislocation of either hip. The pelvic ring is intact. Mild bilateral SI joint osteoarthritis. Mild bilateral SI joint and bilateral hip osteoarthritis. No acute fracture or dislocation. XR HIP 2-3 VW W PELVIS RIGHT    Result Date: 12/2/2021  EXAMINATION: 2 XRAY VIEWS OF THE LEFT HIP, 1 XRAY VIEW OF THE PELVIS AND 2 XRAY VIEWS RIGHT HIP 12/2/2021 2:06 pm COMPARISON: None HISTORY: ORDERING SYSTEM PROVIDED HISTORY: no trauma, pain TECHNOLOGIST PROVIDED HISTORY: Reason for exam:->no trauma, pain Reason for Exam: no trauma, pain FINDINGS: Mild bilateral hip osteoarthritis with mild joint space narrowing. No acute fracture or dislocation of either hip. The pelvic ring is intact. Mild bilateral SI joint osteoarthritis. Mild bilateral SI joint and bilateral hip osteoarthritis. No acute fracture or dislocation. EKG (if obtained): (All EKG's are interpreted by myself in the absence of a cardiologist)    12 lead EKG per my interpretation:  Sinus Bradycardia 59  Axis is   Left axis deviation  QTc is  427  There is specific T wave changes appreciated.  Inverted T wave III  There is no specific ST wave changes appreciated. Prior EKG to compare with was not available       Total critical care time today provided was at least 30 minutes. This excludes seperately billable procedure. Critical care time provided for reviewing labs, images consulting cardiology consulting medicine giving heparin that required close evaluation and/or intervention with concern for patient decompensation. MDM:    Patient with complaint of chest pain shortness of breath. Again symptoms started this morning. Denies history of heart problems or history of DVT. AAA. Came by EMS they did not give aspirin or nitro she took 1 baby aspirin prior to arrival.  On arrival she states chest pressure and shortness of breath and nausea vital signs are stable EKG is negative. I did order morphine and additional aspirin she refused morphine at this time she states the pain is not that bad. To perform cardiopulmonary work-up. Given patient's age and symptoms may need admitted for ACS rule out. Patient recheck doing well her troponin level came back markedly elevated 0.2 EKG again is negative. She has no previous troponins to compare this to. Given her age and symptoms and never having a heart work-up before an elevated troponin I did talk to cardiology will start heparin bolus and drip for non-ST elevation MI will consult medicine who I talked to and will admit to cardiology following patient stable. She otherwise appears well I doubt she has DVT PE or AAA.     CLINICAL IMPRESSION:  Final diagnoses:   Nausea   Chest pain, unspecified type   Dyspnea and respiratory abnormalities   Troponin level elevated   Elevated brain natriuretic peptide (BNP) level   NSTEMI (non-ST elevated myocardial infarction) ([facility] [address]       (Please note that portions of this note may have been completed with a voice recognition program. Efforts were made to edit the dictations but occasionally words aremis-transcribed.)    DISPOSITION REFERRAL (if applicable):  No follow-up provider specified.     DISPOSITION MEDICATIONS (if applicable):  New Prescriptions    No medications on file          [facility], [facility],   12/10/21 [id number]

## 2021-12-10 NOTE — ED NOTES
Patient moved to room 30 and placed on telemetry and other monitors at this time.        Kenisha Starks RN  12/10/21 1788

## 2021-12-10 NOTE — CONSULTS
CARDIOLOGY CONSULT NOTE   Reason for consultation:  SOB /elevated troponin     Referring physician:  No admitting provider for patient encounter. Primary care physician: Liv Hawk MD      Dear    Thanks for the consult. Chief Complaints :  Chief Complaint   Patient presents with    Nausea        History of present illness: María Elena Francisco is a 80 y. o.year old who presents with complains of shortness of breath which is ongoing for several days she also states that her arms have been resting more on the right side it was severe 8 out of 10 intermittent did not last long she denies any chest pain as such but mostly complains of shortness of breath on exertion she does not have any previous cardiac history. She takes metoprolol and aspirin is not on much medication does not have any major medical issues. Denies any ankle swelling falls or injury but is wondering if she was short of breath because she was working more    Past medical history:    has a past medical history of Hyperlipidemia and Hypertension. Past surgical history:   has a past surgical history that includes fracture surgery; Hysterectomy; and Appendectomy. Social History:   reports that she has never smoked. She has never used smokeless tobacco. She reports current alcohol use. She reports that she does not use drugs.   Family history:   no family history of CAD, STROKE of DM at early age    No Known Allergies    ondansetron (ZOFRAN) injection 4 mg, Q30 Min PRN  morphine injection 1 mg, Q30 Min PRN  morphine injection 1 mg, Once  heparin (porcine) injection 4,360 Units, PRN  heparin (porcine) injection 2,180 Units, PRN  heparin 25,000 units in dextrose 5% 250 mL (premix) infusion, Continuous  sodium chloride flush 0.9 % injection 5-40 mL, 2 times per day  sodium chloride flush 0.9 % injection 5-40 mL, PRN  0.9 % sodium chloride infusion, PRN  ondansetron (ZOFRAN-ODT) disintegrating tablet 4 mg, Q8H PRN   Or  ondansetron (ZOFRAN) injection 4 mg, Q6H PRN  acetaminophen (TYLENOL) tablet 650 mg, Q6H PRN   Or  acetaminophen (TYLENOL) suppository 650 mg, Q6H PRN  polyethylene glycol (GLYCOLAX) packet 17 g, Daily PRN  [START ON 12/11/2021] aspirin chewable tablet 81 mg, Daily  atorvastatin (LIPITOR) tablet 80 mg, Nightly  famotidine (PEPCID) tablet 20 mg, Daily  nitroGLYCERIN (NITROSTAT) SL tablet 0.4 mg, Q5 Min PRN      Current Facility-Administered Medications   Medication Dose Route Frequency Provider Last Rate Last Admin    ondansetron (ZOFRAN) injection 4 mg  4 mg IntraVENous Q30 Min PRN [employer], DO   4 mg at 12/10/21 1135    morphine injection 1 mg  1 mg IntraVENous Q30 Min PRN [employer], DO        morphine injection 1 mg  1 mg IntraVENous Once [employer], DO        heparin (porcine) injection 4,360 Units  60 Units/kg IntraVENous PRN [employer], DO        heparin (porcine) injection 2,180 Units  30 Units/kg IntraVENous PRN [employer], DO        heparin 25,000 units in dextrose 5% 250 mL (premix) infusion  5-30 Units/kg/hr IntraVENous Continuous Magan Montano, DO 8.7 mL/hr at 12/10/21 1333 12 Units/kg/hr at 12/10/21 1333    sodium chloride flush 0.9 % injection 5-40 mL  5-40 mL IntraVENous 2 times per day Regine Thapa, APRN - CNP        sodium chloride flush 0.9 % injection 5-40 mL  5-40 mL IntraVENous PRN Regine Thapa APRN - CNP        0.9 % sodium chloride infusion  25 mL IntraVENous PRN Regine Thapa APRN - CNP        ondansetron (ZOFRAN-ODT) disintegrating tablet 4 mg  4 mg Oral Q8H PRN NAEEM Del Angel - CNP        Or    ondansetron (ZOFRAN) injection 4 mg  4 mg IntraVENous Q6H PRN Regine Thapa, APRN - CNP        acetaminophen (TYLENOL) tablet 650 mg  650 mg Oral Q6H PRN Regine Thapa, APRN - CNP        Or    acetaminophen (TYLENOL) suppository 650 mg  650 mg Rectal Q6H PRN Regine Thapa APRN - CNP        polyethylene glycol (GLYCOLAX) packet 17 g  17 g Oral Daily PRN Hilaria HUTSON [place], APRN - CNP        [START ON 12/11/2021] aspirin chewable tablet 81 mg  81 mg Oral Daily NAEEM Wallace CNP        atorvastatin (LIPITOR) tablet 80 mg  80 mg Oral Nightly NAEEM Wallace CNP        famotidine (PEPCID) tablet 20 mg  20 mg Oral Daily NAEEM Wallace CNP        nitroGLYCERIN (NITROSTAT) SL tablet 0.4 mg  0.4 mg SubLINGual Q5 Min PRN NAEEM Osorio CNP         Current Outpatient Medications   Medication Sig Dispense Refill    tiZANidine (ZANAFLEX) 2 MG tablet Take 1 tablet by mouth every 12 hours as needed (spasm) 8 tablet 0    lovastatin (MEVACOR) 40 MG tablet Take 40 mg by mouth daily      metoprolol (LOPRESSOR) 50 MG tablet Take 50 mg by mouth 2 times daily      aspirin 81 MG chewable tablet Take 81 mg by mouth daily       Review of Systems:   · Constitutional: No Fever or Weight Loss   · Eyes: No Decreased Vision  · ENT: No Headaches, Hearing Loss or Vertigo  · Cardiovascular: As per HPI  · Respiratory: As per HPI  · Gastrointestinal: No abdominal pain, appetite loss, blood in stools, constipation, diarrhea or heartburn  · Genitourinary: No dysuria, trouble voiding, or hematuria  · Musculoskeletal:  No gait disturbance, weakness or joint complaints  · Integumentary: No rash or pruritis  · Neurological: No TIA or stroke symptoms  · Psychiatric: No anxiety or depression  · Endocrine: No malaise, fatigue or temperature intolerance  · Hematologic/Lymphatic: No bleeding problems, blood clots or swollen lymph nodes  · Allergic/Immunologic: No nasal congestion or hives  All systems negative except as marked.         Physical Examination:    Vitals:    12/10/21 1147 12/10/21 1149 12/10/21 1150 12/10/21 1253   BP:  (!) 133/53     Pulse:   59    Resp:   18    Temp: 98.2 °F (36.8 °C)      TempSrc: Oral      SpO2:   99%    Weight:    160 lb (72.6 kg)       General Appearance:  No distress, conversant    Constitutional:  Well developed, Well nourished, No acute distress, Non-toxic appearance. HENT:  Normocephalic, Atraumatic, Bilateral external ears normal, Oropharynx moist, No oral exudates, Nose normal. Neck- Normal range of motion, No tenderness, Supple, No stridor,no apical-carotid delay  Lymphatics : no palpable lymph nodes  Eyes:  PERRL, EOMI, Conjunctiva normal, No discharge. Respiratory:  Normal breath sounds, No respiratory distress, No wheezing, No chest tenderness. ,no use of accessory muscles, crackles Absent   Cardiovascular: (PMI) apex non displaced,no lifts no thrills, ankle swelling Absent  , 1+, s1 and s2 audible,Murmur. Present, JVD not noted    Abdomen /GI:  Bowel sounds normal, Soft, No tenderness, No masses, No gross visceromegaly   :  No costovertebral angle tenderness   Musculoskeletal:  No edema, no tenderness, no deformities. Back- no tenderness  Integument:  Well hydrated, no rash   Lymphatic:  No lymphadenopathy noted   Neurologic:  Alert & oriented x 3, CN 2-12 normal, normal motor function, normal sensory function, no focal deficits noted           Medical decision making and Data review:    Lab Review   Recent Labs     12/10/21  1303   WBC 5.4   HGB 12.7   HCT 39.9         Recent Labs     12/10/21  1127      K 4.2      CO2 25   BUN 16   CREATININE 1.0     Recent Labs     12/10/21  1127   AST 18   ALT 10   BILITOT 0.6   ALKPHOS 87     Recent Labs     12/10/21  1127   TROPONINT 0.208*       Recent Labs     12/10/21  1127   PROBNP 2,426*     No results found for: INR, PROTIME    EKG: (reviewed by myself)    ECHO:(reviewed by myself)    Chest Xray:(reviewed by myself)  XR THORACIC SPINE (2 VIEWS)    Result Date: 12/2/2021  EXAMINATION: 3 XRAY VIEWS OF THE THORACIC SPINE 12/2/2021 2:05 pm COMPARISON: None.  HISTORY: ORDERING SYSTEM PROVIDED HISTORY: pain, no truam TECHNOLOGIST PROVIDED HISTORY: Reason for exam:->pain, no truam Reason for Exam: pain, no trauma FINDINGS: Thoracic vertebral bodies are normal in height and alignment. Mild multilevel degenerative changes. See lumbar spine report for discussion of L1 findings. No evidence of fracture. Pedicles are symmetric and intact. Visualized lungs are clear. Mild multilevel degenerative changes of the thoracic spine. No acute focal abnormality of the thoracic spine. XR LUMBAR SPINE (2-3 VIEWS)    Result Date: 12/2/2021  EXAMINATION: THREE XRAY VIEWS OF THE LUMBAR SPINE 12/2/2021 2:06 pm COMPARISON: 08/21/2017 HISTORY: ORDERING SYSTEM PROVIDED HISTORY: pain, no trauma TECHNOLOGIST PROVIDED HISTORY: Reason for exam:->pain, no trauma Reason for Exam: pain, no trauma FINDINGS: There is mild loss of height of the L1 vertebra with slight irregularity along the superior endplate suggesting a compression deformity. When compared to prior radiographs the superior endplate irregularity was present on prior although there is progressive loss of height. Findings suggest a chronic progressive compression fracture of L1. Moderate multilevel disc degenerative changes are noted throughout the lumbar spine with bilateral facet arthritis. No evidence of focal malalignment. Likely chronic L1 superior endplate compression fracture with approximately 20% loss of vertebral body height. Moderate multilevel disc degenerative changes. XR HUMERUS RIGHT (MIN 2 VIEWS)    Result Date: 12/2/2021  EXAMINATION: TWO XRAY VIEWS OF THE RIGHT HUMERUS 12/2/2021 2:05 pm COMPARISON: None. HISTORY: ORDERING SYSTEM PROVIDED HISTORY: severe pain, no trauma TECHNOLOGIST PROVIDED HISTORY: Reason for exam:->severe pain, no trauma Reason for Exam: severe pain, no trauma FINDINGS: No acute fracture or dislocation of the right humerus. No focal osseous lesion. No cortical destruction. There are is mild AC joint osteoarthritis. Glenohumeral joint demonstrates normal alignment. No acute abnormality of the humerus.      XR HIP LEFT (2-3 VIEWS)    Result Date: 12/2/2021  EXAMINATION: 2 XRAY VIEWS OF THE LEFT HIP, 1 XRAY VIEW OF THE PELVIS AND 2 XRAY VIEWS RIGHT HIP 12/2/2021 2:06 pm COMPARISON: None HISTORY: ORDERING SYSTEM PROVIDED HISTORY: no trauma, pain TECHNOLOGIST PROVIDED HISTORY: Reason for exam:->no trauma, pain Reason for Exam: no trauma, pain FINDINGS: Mild bilateral hip osteoarthritis with mild joint space narrowing. No acute fracture or dislocation of either hip. The pelvic ring is intact. Mild bilateral SI joint osteoarthritis. Mild bilateral SI joint and bilateral hip osteoarthritis. No acute fracture or dislocation. XR CHEST PORTABLE    Result Date: 12/10/2021  EXAMINATION: ONE XRAY VIEW OF THE CHEST 12/10/2021 11:06 am COMPARISON: None. HISTORY: ORDERING SYSTEM PROVIDED HISTORY: CP/Nausea TECHNOLOGIST PROVIDED HISTORY: Reason for exam:->CP/Nausea Reason for Exam: CP/Nausea FINDINGS: The heart size is enlarged. The pulmonary vasculature is within normal limits. No acute infiltrates are seen. There are scattered calcified granulomas. No pneumothoraces are seen. 1. No active pulmonary disease. 2. Cardiomegaly without overt failure. XR HIP 2-3 VW W PELVIS RIGHT    Result Date: 12/2/2021  EXAMINATION: 2 XRAY VIEWS OF THE LEFT HIP, 1 XRAY VIEW OF THE PELVIS AND 2 XRAY VIEWS RIGHT HIP 12/2/2021 2:06 pm COMPARISON: None HISTORY: ORDERING SYSTEM PROVIDED HISTORY: no trauma, pain TECHNOLOGIST PROVIDED HISTORY: Reason for exam:->no trauma, pain Reason for Exam: no trauma, pain FINDINGS: Mild bilateral hip osteoarthritis with mild joint space narrowing. No acute fracture or dislocation of either hip. The pelvic ring is intact. Mild bilateral SI joint osteoarthritis. Mild bilateral SI joint and bilateral hip osteoarthritis. No acute fracture or dislocation. All labs, medications and tests reviewed by myself including data  from outside source , patient and available family . Continue all other medications of all above medical condition listed as is.      Impression:  Active

## 2021-12-11 LAB
APTT: 131.8 SECONDS (ref 25.1–37.1)
APTT: 25.6 SECONDS (ref 25.1–37.1)
APTT: 42.2 SECONDS (ref 25.1–37.1)
CHOLESTEROL: 213 MG/DL
HCT VFR BLD CALC: 37.7 % (ref 37–47)
HDLC SERPL-MCNC: 35 MG/DL
HEMOGLOBIN: 12.2 GM/DL (ref 12.5–16)
INR BLD: 1.11 INDEX
LDL CHOLESTEROL DIRECT: 152 MG/DL
MCH RBC QN AUTO: 30.2 PG (ref 27–31)
MCHC RBC AUTO-ENTMCNC: 32.4 % (ref 32–36)
MCV RBC AUTO: 93.3 FL (ref 78–100)
PDW BLD-RTO: 13.6 % (ref 11.7–14.9)
PLATELET # BLD: 155 K/CU MM (ref 140–440)
PMV BLD AUTO: 10 FL (ref 7.5–11.1)
PROTHROMBIN TIME: 14.3 SECONDS (ref 11.7–14.5)
RBC # BLD: 4.04 M/CU MM (ref 4.2–5.4)
TRIGL SERPL-MCNC: 157 MG/DL
TROPONIN T: 0.14 NG/ML
TROPONIN T: 0.15 NG/ML
WBC # BLD: 6.4 K/CU MM (ref 4–10.5)

## 2021-12-11 PROCEDURE — 2060000000 HC ICU INTERMEDIATE R&B

## 2021-12-11 PROCEDURE — 85730 THROMBOPLASTIN TIME PARTIAL: CPT

## 2021-12-11 PROCEDURE — 36415 COLL VENOUS BLD VENIPUNCTURE: CPT

## 2021-12-11 PROCEDURE — 85027 COMPLETE CBC AUTOMATED: CPT

## 2021-12-11 PROCEDURE — 6370000000 HC RX 637 (ALT 250 FOR IP): Performed by: NURSE PRACTITIONER

## 2021-12-11 PROCEDURE — 99232 SBSQ HOSP IP/OBS MODERATE 35: CPT | Performed by: INTERNAL MEDICINE

## 2021-12-11 PROCEDURE — 6360000002 HC RX W HCPCS: Performed by: EMERGENCY MEDICINE

## 2021-12-11 PROCEDURE — 84484 ASSAY OF TROPONIN QUANT: CPT

## 2021-12-11 PROCEDURE — 94761 N-INVAS EAR/PLS OXIMETRY MLT: CPT

## 2021-12-11 PROCEDURE — 2580000003 HC RX 258: Performed by: NURSE PRACTITIONER

## 2021-12-11 PROCEDURE — 80061 LIPID PANEL: CPT

## 2021-12-11 PROCEDURE — 85610 PROTHROMBIN TIME: CPT

## 2021-12-11 PROCEDURE — APPSS15 APP SPLIT SHARED TIME 0-15 MINUTES: Performed by: NURSE PRACTITIONER

## 2021-12-11 PROCEDURE — 6370000000 HC RX 637 (ALT 250 FOR IP): Performed by: STUDENT IN AN ORGANIZED HEALTH CARE EDUCATION/TRAINING PROGRAM

## 2021-12-11 PROCEDURE — 83721 ASSAY OF BLOOD LIPOPROTEIN: CPT

## 2021-12-11 RX ORDER — LOSARTAN POTASSIUM 25 MG/1
25 TABLET ORAL DAILY
Status: DISCONTINUED | OUTPATIENT
Start: 2021-12-11 | End: 2021-12-12 | Stop reason: HOSPADM

## 2021-12-11 RX ADMIN — METOPROLOL TARTRATE 50 MG: 50 TABLET, FILM COATED ORAL at 11:11

## 2021-12-11 RX ADMIN — SODIUM CHLORIDE, PRESERVATIVE FREE 10 ML: 5 INJECTION INTRAVENOUS at 22:05

## 2021-12-11 RX ADMIN — ATORVASTATIN CALCIUM 80 MG: 40 TABLET, FILM COATED ORAL at 21:57

## 2021-12-11 RX ADMIN — FAMOTIDINE 20 MG: 20 TABLET ORAL at 11:11

## 2021-12-11 RX ADMIN — ASPIRIN 81 MG: 81 TABLET, CHEWABLE ORAL at 11:11

## 2021-12-11 RX ADMIN — SODIUM CHLORIDE, PRESERVATIVE FREE 10 ML: 5 INJECTION INTRAVENOUS at 11:12

## 2021-12-11 RX ADMIN — HEPARIN SODIUM AND DEXTROSE 15 UNITS/KG/HR: 10000; 5 INJECTION INTRAVENOUS at 17:08

## 2021-12-11 RX ADMIN — HEPARIN SODIUM 4360 UNITS: 1000 INJECTION INTRAVENOUS; SUBCUTANEOUS at 17:09

## 2021-12-11 RX ADMIN — METOPROLOL TARTRATE 50 MG: 50 TABLET, FILM COATED ORAL at 21:57

## 2021-12-11 RX ADMIN — LOSARTAN POTASSIUM 25 MG: 25 TABLET, FILM COATED ORAL at 17:32

## 2021-12-11 ASSESSMENT — PAIN SCALES - GENERAL: PAINLEVEL_OUTOF10: 0

## 2021-12-11 NOTE — PROGRESS NOTES
Cardiology Note    Admit Date:  12/10/2021     Today's Plan: continue conservative management for now    Admission diagnosis / Complaint : shortness of breath and elevated troponin      Subjective:  [facility] Assessment and Plan:    1. Elevated Troponin- will get 2nd troponin. First troponin 0.208. Would prefer conservative management if troponin is higher. Heparin drip has been started. 2. ECHO yesterday. EF 50-55%. No wall motion abnormalities. Moderate to severe aortic regurgitation. This could be contributing to her shortness of breath. Also echo notes severe mitral regurgitation. 3. HTN: BP is elevated. Recommend to uptitrate BP medications as BP and HR allow. May need to consider adding ARB. Objective:   BP (!) 152/70   Pulse 60   Temp 98.7 °F (37.1 °C) (Oral)   Resp 14   Wt 160 lb (72.6 kg)   SpO2 94%   BMI 29.26 kg/m²     Intake/Output Summary (Last 24 hours) at 12/11/2021 1034  Last data filed at 12/10/2021 2000  Gross per 24 hour   Intake 56.12 ml   Output 400 ml   Net -343.88 ml       TELEMETRY: Sinus    has a past medical history of Hyperlipidemia and Hypertension. has a past surgical history that includes fracture surgery; Hysterectomy; and Appendectomy.   Physical Exam:  General:  Awake, alert, NAD  Skin:  Warm and dry  Neck:  JVD not appreciated  Chest:  Clear to auscultation, respiration easy  Cardiovascular:  RRR S1S2  Abdomen:  Soft nontender  Extremities:   No edema    Medications:    morphine  1 mg IntraVENous Once    sodium chloride flush  5-40 mL IntraVENous 2 times per day    aspirin  81 mg Oral Daily    atorvastatin  80 mg Oral Nightly    famotidine  20 mg Oral Daily    metoprolol tartrate  50 mg Oral BID      heparin (PORCINE) Infusion Stopped (12/11/21 0727)    sodium chloride       ondansetron, morphine, heparin (porcine), heparin (porcine), sodium chloride flush, sodium chloride, ondansetron **OR** ondansetron, acetaminophen **OR** acetaminophen, polyethylene glycol, nitroGLYCERIN    Lab Data:  CBC:   Recent Labs     12/10/21  1127 12/10/21  1303 12/11/21  0459   WBC 5.0 5.4 6.4   HGB 12.5 12.7 12.2*   HCT 39.0 39.9 37.7   MCV 93.3 93.2 93.3    174 155     BMP:   Recent Labs     12/10/21  1127      K 4.2      CO2 25   BUN 16   CREATININE 1.0     LIVER PROFILE:   Recent Labs     12/10/21  1127   AST 18   ALT 10   LIPASE 31   BILITOT 0.6   ALKPHOS 87     PT/INR: No results for input(s): PROTIME, INR in the last 72 hours. APTT:   Recent Labs     12/10/21  1303 12/10/21  2148 12/11/21  0459   APTT 23.0* 56.4* 131.8*     BNP:  No results for input(s): BNP in the last 72 hours. TROPONIN: @TROPONINI:3@          NAEEM Mitchell CNP,12/11/2021 10:34 AM     I have seen ,spoken to  and examined this patient personally, independently of the nurse practitioner. I have reviewed the hospital care given to date and reviewed all pertinent labs and imaging. The plan was developed mutually at the time of the visit with the patient,  NP  and myself. I have spoken with patient, nursing staff and provided written and verbal instructions . The above note has been reviewed and I agree with the assessment, diagnosis, and treatment plan with changes made by me as follows     CARDIOLOGY ATTENDING ADDENDUM    HPI:  I have reviewed the above HPI  And agree with above   Yumiko Croft is a 80 y. o.year old who and presents with had concerns including Nausea.   Chief Complaint   Patient presents with    Nausea     Interval history:  AS ABOVE    Physical Exam:  General:  Awake, alert, NAD  Head:normal  Eye:normal  Neck:  No JVD   Chest:  Clear to auscultation, respiration easy  Cardiovascular:  RRR S1S2  Abdomen:   nontender  Extremities:  NO edema  Pulses; palpable  Neuro: grossly normal      MEDICAL DECISION MAKING;    I agree with the above plan, which was planned by myself and discussed with NP.

## 2021-12-11 NOTE — ED NOTES
Marco Mejia [facility]) [phone]    Call with any information on patient. Thank you.      Luis Miguel Bermudez RN  12/10/21 1922

## 2021-12-11 NOTE — PROGRESS NOTES
Hospitalist Progress Note      Name:  Jeremiah Dahl /Age/Sex: 1925  (80 y.o. female)   MRN & CSN:  [id number] & [id number] Admission Date/Time: 12/10/2021 10:58 AM   Location:  [address]-A PCP: Pat Hawk MD         Hospital Day: 2    Assessment and Plan: Jeremiah Dahl is a 80 y.o.  female  who presents with     Elevated troponin  -denies current chest pressure or right arm pain  -serial trop. Staff shortage to do draw bloods today.   -Trop has trended down to 0.144. Will discontinue heparin drip and continue BB, statin, ASA. Will add ARB for better BP control  -Follow up repeat EKG  - cardiology following  -Echo with LEVEF 50-55%, moderate to severe AR, severe MR    HTN  -ARB added as above for better control  -continue other meds    DVT ppx    Diet ADULT DIET; Clear Liquid; No Caffeine   DVT Prophylaxis [] Lovenox, []  Heparin, [] SCDs, [] Ambulation   GI Prophylaxis [] PPI,  [] H2 Blocker,  [] Carafate,  [] Diet/Tube Feeds   Code Status DNR-CCA   Disposition Patient requires continued admission due to elevated troponin   MDM [] Low, [x] Moderate,[]  High     History of Present Illness:     Chief Complaint: <principal problem not specified>  Jeremiah Dahl is a 80 y.o.  female  who presents with chest pressure and right arm pain  Currently the above have resolved. She denies discomfort. Daughter at bedside updated. Ten point ROS reviewed negative, unless as noted above    Objective:        Intake/Output Summary (Last 24 hours) at 2021 1711  Last data filed at 12/10/2021 2000  Gross per 24 hour   Intake 56.12 ml   Output 400 ml   Net -343.88 ml      Vitals:   Vitals:    21 1524   BP: (!) 157/69   Pulse:    Resp: 16   Temp: 98.3 °F (36.8 °C)   SpO2: 99%     Physical Exam:   Gen: Awake F, no acute cardiopulmonary distress, appears younger than stated age  HEENT: NC/AT, MM pink, moist  CVS: S1S2 RRR, +murmur, no gallops or rubs  Resp: CTAB  Abdo: soft, NT  MSK: no cyanosis, no edema  Neuro: no lateralizing weakness, AAOx4  Psych: affect appropriate  Medications:   Medications:    losartan  25 mg Oral Daily    morphine  1 mg IntraVENous Once    sodium chloride flush  5-40 mL IntraVENous 2 times per day    aspirin  81 mg Oral Daily    atorvastatin  80 mg Oral Nightly    famotidine  20 mg Oral Daily    metoprolol tartrate  50 mg Oral BID      Infusions:    sodium chloride       PRN Meds: ondansetron, 4 mg, Q30 Min PRN  morphine, 1 mg, Q30 Min PRN  sodium chloride flush, 5-40 mL, PRN  sodium chloride, 25 mL, PRN  ondansetron, 4 mg, Q8H PRN   Or  ondansetron, 4 mg, Q6H PRN  acetaminophen, 650 mg, Q6H PRN   Or  acetaminophen, 650 mg, Q6H PRN  polyethylene glycol, 17 g, Daily PRN  nitroGLYCERIN, 0.4 mg, Q5 Min PRN    Patient is still admitted because of chest pain r/o MI. The anticipated discharge is in less than 24 hours.      Electronically signed by Camron Gavin MD on 12/11/2021 at 5:11 PM

## 2021-12-12 VITALS
HEART RATE: 67 BPM | DIASTOLIC BLOOD PRESSURE: 65 MMHG | BODY MASS INDEX: 29.26 KG/M2 | WEIGHT: 160 LBS | TEMPERATURE: 97.6 F | RESPIRATION RATE: 16 BRPM | SYSTOLIC BLOOD PRESSURE: 146 MMHG | OXYGEN SATURATION: 95 %

## 2021-12-12 LAB
APTT: 25.1 SECONDS (ref 25.1–37.1)
APTT: 25.3 SECONDS (ref 25.1–37.1)
APTT: 31.6 SECONDS (ref 25.1–37.1)
HCT VFR BLD CALC: 36.7 % (ref 37–47)
HEMOGLOBIN: 11.8 GM/DL (ref 12.5–16)
MCH RBC QN AUTO: 29.9 PG (ref 27–31)
MCHC RBC AUTO-ENTMCNC: 32.2 % (ref 32–36)
MCV RBC AUTO: 92.9 FL (ref 78–100)
PDW BLD-RTO: 13.5 % (ref 11.7–14.9)
PLATELET # BLD: 145 K/CU MM (ref 140–440)
PMV BLD AUTO: 10.3 FL (ref 7.5–11.1)
RBC # BLD: 3.95 M/CU MM (ref 4.2–5.4)
TROPONIN T: 0.12 NG/ML
WBC # BLD: 5.3 K/CU MM (ref 4–10.5)

## 2021-12-12 PROCEDURE — 6370000000 HC RX 637 (ALT 250 FOR IP): Performed by: STUDENT IN AN ORGANIZED HEALTH CARE EDUCATION/TRAINING PROGRAM

## 2021-12-12 PROCEDURE — 6360000002 HC RX W HCPCS: Performed by: STUDENT IN AN ORGANIZED HEALTH CARE EDUCATION/TRAINING PROGRAM

## 2021-12-12 PROCEDURE — APPSS15 APP SPLIT SHARED TIME 0-15 MINUTES: Performed by: NURSE PRACTITIONER

## 2021-12-12 PROCEDURE — 84484 ASSAY OF TROPONIN QUANT: CPT

## 2021-12-12 PROCEDURE — 36415 COLL VENOUS BLD VENIPUNCTURE: CPT

## 2021-12-12 PROCEDURE — 94761 N-INVAS EAR/PLS OXIMETRY MLT: CPT

## 2021-12-12 PROCEDURE — 2580000003 HC RX 258: Performed by: NURSE PRACTITIONER

## 2021-12-12 PROCEDURE — 85730 THROMBOPLASTIN TIME PARTIAL: CPT

## 2021-12-12 PROCEDURE — 93005 ELECTROCARDIOGRAM TRACING: CPT | Performed by: STUDENT IN AN ORGANIZED HEALTH CARE EDUCATION/TRAINING PROGRAM

## 2021-12-12 PROCEDURE — 6370000000 HC RX 637 (ALT 250 FOR IP): Performed by: NURSE PRACTITIONER

## 2021-12-12 PROCEDURE — 85027 COMPLETE CBC AUTOMATED: CPT

## 2021-12-12 RX ORDER — CLOPIDOGREL BISULFATE 75 MG/1
75 TABLET ORAL DAILY
Qty: 30 TABLET | Refills: 3 | Status: SHIPPED | OUTPATIENT
Start: 2021-12-12

## 2021-12-12 RX ORDER — LOSARTAN POTASSIUM 25 MG/1
25 TABLET ORAL DAILY
Qty: 30 TABLET | Refills: 3 | Status: SHIPPED | OUTPATIENT
Start: 2021-12-13

## 2021-12-12 RX ADMIN — ENOXAPARIN SODIUM 30 MG: 100 INJECTION SUBCUTANEOUS at 11:04

## 2021-12-12 RX ADMIN — FAMOTIDINE 20 MG: 20 TABLET ORAL at 11:05

## 2021-12-12 RX ADMIN — SODIUM CHLORIDE, PRESERVATIVE FREE 10 ML: 5 INJECTION INTRAVENOUS at 11:06

## 2021-12-12 RX ADMIN — ASPIRIN 81 MG: 81 TABLET, CHEWABLE ORAL at 11:05

## 2021-12-12 RX ADMIN — METOPROLOL TARTRATE 50 MG: 50 TABLET, FILM COATED ORAL at 11:04

## 2021-12-12 RX ADMIN — LOSARTAN POTASSIUM 25 MG: 25 TABLET, FILM COATED ORAL at 11:05

## 2021-12-12 NOTE — PROGRESS NOTES
Cardiology Note    Admit Date:  12/10/2021     Today's Plan: continue conservative management for now. Patient is stable for discharge    Admission diagnosis / Complaint : shortness of breath and elevated troponin      Subjective:  Ms. Julian Ma is resting in bed. Assessment and Plan:    1. Elevated Troponin- will get 2nd troponin. First troponin 0.208. Would prefer conservative management if troponin is higher. Troponins are decreasing. Heparin drip has been discontinued. 2. ECHO yesterday. EF 50-55%. No wall motion abnormalities. Moderate to severe aortic regurgitation. This could be contributing to her shortness of breath. Also echo notes severe mitral regurgitation. 3. HTN: BP is elevated. Recommend to uptitrate BP medications as BP and HR allow. ARB started yesterday    Objective:   BP (!) 162/70   Pulse 73   Temp 97.6 °F (36.4 °C) (Oral)   Resp 16   Wt 160 lb (72.6 kg)   SpO2 97%   BMI 29.26 kg/m²   No intake or output data in the 24 hours ending 12/12/21 1122    TELEMETRY: Silke    has a past medical history of Hyperlipidemia and Hypertension. has a past surgical history that includes fracture surgery; Hysterectomy; and Appendectomy.   Physical Exam:  General:  Awake, alert, NAD  Skin:  Warm and dry  Neck:  JVD not appreciated  Chest:  Clear to auscultation, respiration easy  Cardiovascular:  RRR S1S2  Abdomen:  Soft nontender  Extremities:   No edema    Medications:    losartan  25 mg Oral Daily    enoxaparin  30 mg SubCUTAneous Daily    sodium chloride flush  5-40 mL IntraVENous 2 times per day    aspirin  81 mg Oral Daily    atorvastatin  80 mg Oral Nightly    famotidine  20 mg Oral Daily    metoprolol tartrate  50 mg Oral BID      sodium chloride       ondansetron, morphine, sodium chloride flush, sodium chloride, ondansetron **OR** ondansetron, acetaminophen **OR** acetaminophen, polyethylene glycol, nitroGLYCERIN    Lab Data:  CBC:   Recent Labs     12/10/21  1303 12/11/21  0459 12/12/21  0359   WBC 5.4 6.4 5.3   HGB 12.7 12.2* 11.8*   HCT 39.9 37.7 36.7*   MCV 93.2 93.3 92.9    155 145     BMP:   Recent Labs     12/10/21  1127      K 4.2      CO2 25   BUN 16   CREATININE 1.0     LIVER PROFILE:   Recent Labs     12/10/21  1127   AST 18   ALT 10   LIPASE 31   BILITOT 0.6   ALKPHOS 87     PT/INR:   Recent Labs     12/11/21  1509   PROTIME 14.3   INR 1.11     APTT:   Recent Labs     12/11/21  2102 12/12/21  0359 12/12/21  0903   APTT 42.2* 25.3 25.1     BNP:  No results for input(s): BNP in the last 72 hours.   TROPONIN: @TROPONINI:3@          NAEEM Hernandez CNP,12/12/2021 11:22 AM

## 2021-12-12 NOTE — DISCHARGE SUMMARY
67   Temp 97.6 °F (36.4 °C) (Oral)   Resp 16   Wt 160 lb (72.6 kg)   SpO2 95%   BMI 29.26 kg/m²            PHYSICAL EXAM   GEN Awake female, sitting upright in bed in no apparent distress. Appears given age. EYES Pupils are equally round. No scleral erythema, discharge, or conjunctivitis. HENT Mucous membranes are moist. Oral pharynx without exudates, no evidence of thrush. NECK Supple, no apparent thyromegaly or masses. RESP Clear to auscultation, no wheezes, rales or rhonchi. Symmetric chest movement while on room air. CARDIO/VASC S1/S2 auscultated. Regular rate without appreciable murmurs, rubs, or gallops. No JVD or carotid bruits. Peripheral pulses equal bilaterally and palpable. No peripheral edema. GI Abdomen is soft without significant tenderness, masses, or guarding. Bowel sounds are normoactive.  No costovertebral angle tenderness. Normal appearing external genitalia. Davis catheter is not present. MSK No gross joint deformities. SKIN Normal coloration, warm, dry. NEURO Cranial nerves appear grossly intact, normal speech, no lateralizing weakness. PSYCH Awake, alert, oriented x 4. Affect appropriate. BMP/CBC  Recent Labs     12/10/21  1127 12/10/21  1127 12/10/21  1303 12/11/21  0459 12/12/21  0359     --   --   --   --    K 4.2  --   --   --   --      --   --   --   --    CO2 25  --   --   --   --    BUN 16  --   --   --   --    CREATININE 1.0  --   --   --   --    WBC 5.0   < > 5.4 6.4 5.3   HCT 39.0   < > 39.9 37.7 36.7*      < > 174 155 145    < > = values in this interval not displayed. IMAGING:  Chest X Ray  1. No active pulmonary disease. 2. Cardiomegaly without overt failure.      Discharge Time of 60 minutes    Electronically signed by Nikki Patel MD on 12/12/2021 at 3:37 PM

## 2021-12-13 LAB
EKG ATRIAL RATE: 63 BPM
EKG DIAGNOSIS: NORMAL
EKG P AXIS: 20 DEGREES
EKG P-R INTERVAL: 174 MS
EKG Q-T INTERVAL: 416 MS
EKG QRS DURATION: 94 MS
EKG QTC CALCULATION (BAZETT): 425 MS
EKG R AXIS: -27 DEGREES
EKG T AXIS: -13 DEGREES
EKG VENTRICULAR RATE: 63 BPM

## 2021-12-13 PROCEDURE — 93010 ELECTROCARDIOGRAM REPORT: CPT | Performed by: INTERNAL MEDICINE

## 2022-01-19 ENCOUNTER — OFFICE VISIT (OUTPATIENT)
Dept: CARDIOLOGY CLINIC | Age: 87
End: 2022-01-19
Payer: MEDICARE

## 2022-01-19 VITALS
BODY MASS INDEX: 28.26 KG/M2 | SYSTOLIC BLOOD PRESSURE: 132 MMHG | WEIGHT: 153.6 LBS | HEIGHT: 62 IN | DIASTOLIC BLOOD PRESSURE: 82 MMHG | HEART RATE: 64 BPM

## 2022-01-19 DIAGNOSIS — I38 VHD (VALVULAR HEART DISEASE): Primary | ICD-10-CM

## 2022-01-19 DIAGNOSIS — Z92.89 HISTORY OF RECENT HOSPITALIZATION: ICD-10-CM

## 2022-01-19 DIAGNOSIS — R06.02 SHORTNESS OF BREATH: ICD-10-CM

## 2022-01-19 PROCEDURE — 99214 OFFICE O/P EST MOD 30 MIN: CPT | Performed by: NURSE PRACTITIONER

## 2022-01-19 PROCEDURE — 1111F DSCHRG MED/CURRENT MED MERGE: CPT | Performed by: NURSE PRACTITIONER

## 2022-01-19 RX ORDER — LEVOTHYROXINE SODIUM 50 MCG
TABLET ORAL
COMMUNITY
Start: 2021-11-19

## 2022-01-19 ASSESSMENT — ENCOUNTER SYMPTOMS
SHORTNESS OF BREATH: 0
COUGH: 0

## 2022-01-19 NOTE — PROGRESS NOTES
[facility] [place] [facility] [place]  [address]  Phone: [phone]    Fax [phone]    Reanna Kirkland MD, Jevon Esteves MD, Amina Russ MD, MD Birgit Vazquez MD Kelsey Caroline, MD Laurann Parr, MD Rom Sage, APREDEN Mcneil, APREDEN Hernández, APRN     Iveth Antunez, APREDEN Duran PA-C     CARDIOLOGY  NOTE    2022    Horace Lorenzana (:  1925) is a 80 y.o. female,an established patient with Dr. Ayleen Guerra, here for evaluation of the following chief complaint(s):  Follow-Up from Hospital         SUBJECTIVE/OBJECTIVE:    She states that she does not remember going to the hospital nor does she remember much of the details while being at the hospital. She denies any additional chest pain. She does hae shortness of breath but states \"it is been this way for years\". She is with her friend and POA today whom takes care of her and all her needs. She remains in independent living and is doing very well. She walks in her apartment a lot and she does as much as she can by hersels. She denies chest pain palpitations dizziness orthopnea or syncope. Review of Systems   Constitutional: Negative for fatigue and fever. Respiratory: Negative for cough and shortness of breath. Cardiovascular: Negative for chest pain, palpitations and leg swelling. Musculoskeletal: Positive for arthralgias. Negative for gait problem. Neurological: Negative for dizziness, syncope, weakness, light-headedness and headaches.        Vitals:    22 1139   BP: 132/82   Pulse: 64   Weight: 153 lb 9.6 oz (69.7 kg)   Height: 5' 2\" (1.575 m)       Wt Readings from Last 3 Encounters:   22 153 lb 9.6 oz (69.7 kg)   12/10/21 160 lb (72.6 kg)   21 160 lb (72.6 kg)       BP Readings from Last 3 Encounters:   22 132/82   21 (!) 146/65   21 116/84       Prior to Admission medications    Medication Sig Start Date End Date Taking? Authorizing Provider   SYNTHROID 50 MCG tablet  11/19/21  Yes Historical Provider, MD   losartan (COZAAR) 25 MG tablet Take 1 tablet by mouth daily 12/13/21  Yes Bashir Moreno MD   clopidogrel (PLAVIX) 75 MG tablet Take 1 tablet by mouth daily 12/12/21  Yes Bashir Moreno MD   tiZANidine (ZANAFLEX) 2 MG tablet Take 1 tablet by mouth every 12 hours as needed (spasm) 12/2/21  Yes Shelvy Russian, APRN - CNP   lovastatin (MEVACOR) 40 MG tablet Take 40 mg by mouth daily   Yes Historical Provider, MD   metoprolol (LOPRESSOR) 50 MG tablet Take 50 mg by mouth 2 times daily   Yes Historical Provider, MD   aspirin 81 MG chewable tablet Take 81 mg by mouth daily   Yes Historical Provider, MD       Physical Exam  Vitals reviewed. Constitutional:       General: She is not in acute distress. Appearance: Normal appearance. She is not ill-appearing. HENT:      Head: Atraumatic. Neck:      Vascular: No carotid bruit. Cardiovascular:      Rate and Rhythm: Normal rate and regular rhythm. Pulses: Normal pulses. Heart sounds: Normal heart sounds. No murmur heard. Pulmonary:      Effort: Pulmonary effort is normal. No respiratory distress. Breath sounds: Normal breath sounds. Musculoskeletal:         General: No swelling or deformity. Cervical back: Neck supple. No muscular tenderness. Neurological:      Mental Status: She is alert.          Health Maintenance   Topic Date Due    Depression Screen  Never done    Shingles Vaccine (1 of 2) Never done    DTaP/Tdap/Td vaccine (1 - Tdap) 12/30/2005    Pneumococcal 65+ years Vaccine (2 of 2 - PPSV23) 09/30/2016    Annual Wellness Visit (AWV)  Never done    Potassium monitoring  12/10/2022    Creatinine monitoring  12/10/2022    Lipid screen  12/11/2022    Flu vaccine  Completed    COVID-19 Vaccine  Completed    Hepatitis A vaccine  Aged Out    Hepatitis B vaccine  Aged Out    Hib vaccine  Aged Out    Meningococcal (ACWY) vaccine  Aged Out       Lab Review   Lab Results   Component Value Date    CHOL 213 12/11/2021    TRIG 157 12/11/2021    HDL 35 12/11/2021    LDLDIRECT 152 12/11/2021        Echocardiogram: 12/10/2021    Summary   Left ventricular systolic function is normal.   Ejection fraction is visually estimated at 50-55%. Moderate-to-severe aortic regurgitation; PHT: 395 msec, vena contracta: 0.65 cm. Severe mitral regurgitation with multiple jets noted. Mild tricuspid regurgitation; RVSP: 39 mmHg. Mild pulmonic regurgitation present. No evidence of any pericardial effusion. ASSESSMENT/PLAN:    1. History of recent hospitalization  No more chest pain   2. Shortness of breath  Improved   Continue metoprolol an losartan  3. VHD (valvular heart disease)  She is doing better  Needs to monitor weight better- Scale given. Discussed VHD and balance fluid and medical management only at this time she is agreeable, her POA is also agreeable. Return in about 3 months (around 4/19/2022) for or sonner if needed. An electronic signature was used to authenticate this note.     Electronically signed by Pierce Apley, APRN - CNP on 1/19/2022 at 11:46 AM

## 2022-01-19 NOTE — PATIENT INSTRUCTIONS
**It is YOUR responsibilty to bring medication bottles and/or updated medication list to [address] [place] [address]. This will allow us to better serve you and all your healthcare needs**    Please be informed that if you contact our office outside of normal business hours the physician on call cannot help with any scheduling or rescheduling issues, procedure instruction questions or any type of medication issue. We advise you for any urgent/emergency that you go to the nearest emergency room!     PLEASE CALL OUR OFFICE DURING NORMAL BUSINESS HOURS    Monday - Friday   8 am to 5 pm    [place]: [address]: [phone]    [place]:  [phone]

## 2022-04-19 ENCOUNTER — OFFICE VISIT (OUTPATIENT)
Dept: CARDIOLOGY CLINIC | Age: 87
End: 2022-04-19
Payer: MEDICARE

## 2022-04-19 VITALS
DIASTOLIC BLOOD PRESSURE: 82 MMHG | HEIGHT: 62 IN | BODY MASS INDEX: 28.41 KG/M2 | HEART RATE: 80 BPM | WEIGHT: 154.4 LBS | SYSTOLIC BLOOD PRESSURE: 126 MMHG

## 2022-04-19 DIAGNOSIS — I10 PRIMARY HYPERTENSION: ICD-10-CM

## 2022-04-19 DIAGNOSIS — R77.8 TROPONIN I ABOVE REFERENCE RANGE: ICD-10-CM

## 2022-04-19 DIAGNOSIS — I38 VHD (VALVULAR HEART DISEASE): ICD-10-CM

## 2022-04-19 DIAGNOSIS — R06.02 SHORTNESS OF BREATH: ICD-10-CM

## 2022-04-19 DIAGNOSIS — I21.4 NSTEMI (NON-ST ELEVATED MYOCARDIAL INFARCTION) (HCC): Primary | ICD-10-CM

## 2022-04-19 PROBLEM — I05.9 MITRAL VALVE DISEASE: Status: ACTIVE | Noted: 2022-01-19

## 2022-04-19 PROCEDURE — 99214 OFFICE O/P EST MOD 30 MIN: CPT | Performed by: INTERNAL MEDICINE

## 2022-04-19 NOTE — ASSESSMENT & PLAN NOTE
She has severe mitral regurgitation but she is active does not appear volume overloaded lives independently checks feet regularly which has been stable

## 2022-04-19 NOTE — ASSESSMENT & PLAN NOTE
Normal troponin level in December 2021 but currently asymptomatic continue conservative management advised her to stop using aspirin continue Plavix.   Echo shows no wall motion abnormality

## 2022-04-19 NOTE — ASSESSMENT & PLAN NOTE
Denies any dizzy spells or passing out events she is currently on losartan 25 mg dailyAnd metoprolol 50 mg twice daily continue to monitor

## 2022-04-19 NOTE — PROGRESS NOTES
CARDIOLOGY  NOTE    Chief Complaint: Cardiomyopathy     HPI:   Claribel Dunn is a 80y.o. year old who has Past medical history as noted below. Claribel Dunn comes in with her caretaker she is very hard of hearing but currently living in assisted living facility. She denies any chest pain as such she seems to be in very good spirits she was actually admitted and went to the emergency department in December 2021 at that time her troponin peaked at 0.21 but no further work-up was done due to advanced age no particular symptoms and not actively hallucinating at the time. Since then an echo has showed normal wall motion function and normal EF but severe mitral regurgitation but she has no signs of volume overload she is still on aspirin and Plavix tolerating it. Weight has been stable according to her caretaker she has denied any shortness of breath or chest pain      Current Outpatient Medications   Medication Sig Dispense Refill    SYNTHROID 50 MCG tablet       losartan (COZAAR) 25 MG tablet Take 1 tablet by mouth daily 30 tablet 3    clopidogrel (PLAVIX) 75 MG tablet Take 1 tablet by mouth daily 30 tablet 3    tiZANidine (ZANAFLEX) 2 MG tablet Take 1 tablet by mouth every 12 hours as needed (spasm) 8 tablet 0    lovastatin (MEVACOR) 40 MG tablet Take 40 mg by mouth daily      metoprolol (LOPRESSOR) 50 MG tablet Take 50 mg by mouth 2 times daily       No current facility-administered medications for this visit. Allergies:   Patient has no known allergies. Patient History:  Past Medical History:   Diagnosis Date    Hyperlipidemia     Hypertension      Past Surgical History:   Procedure Laterality Date    APPENDECTOMY      FRACTURE SURGERY      HYSTERECTOMY       No family history on file.   Social History     Tobacco Use    Smoking status: Never Smoker    Smokeless tobacco: Never Used   Substance Use Topics    Alcohol use: Yes     Comment: q. six months        Review of Systems:   · Constitutional: No Fever or Weight Loss   · Eyes: No Decreased Vision  · ENT: No Headaches, positive for hearing Loss or Vertigo  · Cardiovascular: as per note above   · Respiratory: No cough or wheezing and as per note above. · Gastrointestinal: No abdominal pain, appetite loss, blood in stools, constipation, diarrhea or heartburn  · Genitourinary: No dysuria, trouble voiding, or hematuria  · Musculoskeletal:  denies any new  joint aches , swelling  or pain   · Integumentary: No rash or pruritis  · Neurological: No TIA or stroke symptoms does have memory deficits and he dementia  · Psychiatric: No anxiety or depression  · Endocrine: No malaise, fatigue or temperature intolerance  · Hematologic/Lymphatic: No bleeding problems, blood clots or swollen lymph nodes  · Allergic/Immunologic: No nasal congestion or hives    Objective:      Physical Exam:  /82   Pulse 80   Ht 5' 2\" (1.575 m)   Wt 154 lb 6.4 oz (70 kg)   BMI 28.24 kg/m²   Wt Readings from Last 3 Encounters:   04/19/22 154 lb 6.4 oz (70 kg)   01/19/22 153 lb 9.6 oz (69.7 kg)   12/10/21 160 lb (72.6 kg)     Body mass index is 28.24 kg/m². Vitals:    04/19/22 1120   BP: 126/82   Pulse: 80        General Appearance:  No distress, conversant  Constitutional:  Well developed, Well nourished, No acute distress, Non-toxic appearance. HENT:  Normocephalic, Atraumatic, Bilateral external ears normal, Oropharynx moist, No oral exudates, Nose normal. Neck- Normal range of motion, No tenderness, Supple, No stridor,no apical-carotid delay  Eyes:  PERRL, EOMI, Conjunctiva normal, No discharge. Respiratory:  Normal breath sounds, No respiratory distress, No wheezing, No chest tenderness. ,no use of accessory muscles, NO crackles  Cardiovascular: (PMI) apex non displaced,no lifts no thrills,S1 and S2 audible, No added heart sounds, No signs of ankle edema, or volume overload, No evidence of JVD, No crackles  GI:  Bowel sounds normal, Soft, No tenderness, No masses, No gross visceromegaly   :  No costovertebral angle tenderness   Musculoskeletal:  No edema, no tenderness, no deformities. Back- no tenderness  Integument:  Well hydrated, no rash   Lymphatic:  No lymphadenopathy noted   Neurologic:  Alert & oriented x 3, CN 2-12 normal, normal motor function, normal sensory function, no focal deficits noted   Psychiatric:  Speech and behavior appropriate       Medical decision making and Data review:  DATA:  Lab Results   Component Value Date    TROPONINT 0.115 (HH) 12/12/2021     BNP:    Lab Results   Component Value Date    PROBNP 2,426 (H) 12/10/2021     PT/INR:  No results found for: PTINR  No results found for: LABA1C  Lab Results   Component Value Date    CHOL 213 (H) 12/11/2021    TRIG 157 (H) 12/11/2021    HDL 35 (L) 12/11/2021    LDLDIRECT 152 (H) 12/11/2021     Lab Results   Component Value Date    ALT 10 12/10/2021    AST 18 12/10/2021     No results for input(s): WBC, HGB, HCT, MCV, PLT in the last 72 hours. TSH: No results found for: TSH  Lab Results   Component Value Date    AST 18 12/10/2021    ALT 10 12/10/2021    BILITOT 0.6 12/10/2021    ALKPHOS 87 12/10/2021     Lab Results   Component Value Date    PROBNP 2,426 (H) 12/10/2021     No results found for: LABA1C  Lab Results   Component Value Date    WBC 5.3 12/12/2021    HGB 11.8 (L) 12/12/2021    HCT 36.7 (L) 12/12/2021     12/12/2021     All labs, medications and tests reviewed by myself including data and history from outside source , patient and available family . Assessment & Plan:      1. NSTEMI (non-ST elevated myocardial infarction) ([address]    2. Shortness of breath    3. VHD (valvular heart disease)    4. Troponin I above reference range    5. Primary hypertension         Troponin I above reference range  Normal troponin level in December 2021 but currently asymptomatic continue conservative management advised her to stop using aspirin continue Plavix.   Echo shows no wall motion abnormality    Shortness of breath  She has severe mitral regurgitation but she is active does not appear volume overloaded lives independently checks feet regularly which has been stable    Mitral valve disease   On examination appears euvolemic she does not have significant murmur echo shows severe mitral regurg but normal LV size    Primary hypertension  Denies any dizzy spells or passing out events she is currently on losartan 25 mg dailyAnd metoprolol 50 mg twice daily continue to monitor     Dyslipidemia :  All available lab work was reviewed. Continue Mevacor patient was advised to repeat lab work before next visit. Necessary orders were placed , instructions given by myself       Counseled extensively and medication compliance urged. Various goals were discussed and questions answered. Continue current medications. Appropriate prescriptions are addressed and refills ordered. Questions answered and patient verbalizes understanding. Call for any problems, questions, or concerns. Greater than 60 % of time spent counseling besides reviewing data and images     Continue all other medications of all above medical condition listed as is. Return in about 6 months (around 10/19/2022). Please note this report has been partially produced using speech recognition software and may contain errors related to that system including errors in grammar, punctuation, and spelling, as well as words and phrases that may be inappropriate. If there are any questions or concerns please feel free to contact the dictating provider for clarification.

## 2022-04-19 NOTE — ASSESSMENT & PLAN NOTE
On examination appears euvolemic she does not have significant murmur echo shows severe mitral regurg but normal LV size

## 2022-08-26 ENCOUNTER — HOSPITAL ENCOUNTER (OUTPATIENT)
Age: 87
Setting detail: SPECIMEN
Discharge: HOME | End: 2022-08-26
Payer: MEDICARE

## 2022-08-26 LAB
ALBUMIN SERPL-MCNC: 4 GM/DL (ref 3.4–5)
ALP BLD-CCNC: 86 IU/L (ref 40–128)
ALT SERPL-CCNC: 5 U/L (ref 10–40)
ANION GAP SERPL CALCULATED.3IONS-SCNC: 10 MMOL/L (ref 4–16)
AST SERPL-CCNC: 14 IU/L (ref 15–37)
BILIRUB SERPL-MCNC: 0.8 MG/DL (ref 0–1)
BUN BLDV-MCNC: 21 MG/DL (ref 6–23)
CALCIUM SERPL-MCNC: 8.8 MG/DL (ref 8.3–10.6)
CHLORIDE BLD-SCNC: 106 MMOL/L (ref 99–110)
CO2: 27 MMOL/L (ref 21–32)
CREAT SERPL-MCNC: 1.3 MG/DL (ref 0.6–1.1)
GFR AFRICAN AMERICAN: 46 ML/MIN/1.73M2
GFR NON-AFRICAN AMERICAN: 38 ML/MIN/1.73M2
GLUCOSE BLD-MCNC: 113 MG/DL (ref 70–99)
HCT VFR BLD CALC: 36.8 % (ref 37–47)
HEMOGLOBIN: 12 GM/DL (ref 12.5–16)
MCH RBC QN AUTO: 29.9 PG (ref 27–31)
MCHC RBC AUTO-ENTMCNC: 32.6 % (ref 32–36)
MCV RBC AUTO: 91.5 FL (ref 78–100)
PDW BLD-RTO: 13.3 % (ref 11.7–14.9)
PLATELET # BLD: 106 K/CU MM (ref 140–440)
PMV BLD AUTO: 9.8 FL (ref 7.5–11.1)
POTASSIUM SERPL-SCNC: 3.9 MMOL/L (ref 3.5–5.1)
PRO-BNP: 900.3 PG/ML
RBC # BLD: 4.02 M/CU MM (ref 4.2–5.4)
SODIUM BLD-SCNC: 143 MMOL/L (ref 135–145)
TOTAL PROTEIN: 5.9 GM/DL (ref 6.4–8.2)
TSH HIGH SENSITIVITY: 3.92 UIU/ML (ref 0.27–4.2)
WBC # BLD: 4 K/CU MM (ref 4–10.5)

## 2022-08-26 PROCEDURE — 85027 COMPLETE CBC AUTOMATED: CPT

## 2022-08-26 PROCEDURE — 36415 COLL VENOUS BLD VENIPUNCTURE: CPT

## 2022-08-26 PROCEDURE — 84443 ASSAY THYROID STIM HORMONE: CPT

## 2022-08-26 PROCEDURE — 83880 ASSAY OF NATRIURETIC PEPTIDE: CPT

## 2022-08-26 PROCEDURE — 80053 COMPREHEN METABOLIC PANEL: CPT

## 2022-08-30 ENCOUNTER — OFFICE VISIT (OUTPATIENT)
Dept: CARDIOLOGY CLINIC | Age: 87
End: 2022-08-30
Payer: MEDICARE

## 2022-08-30 VITALS
HEART RATE: 78 BPM | SYSTOLIC BLOOD PRESSURE: 136 MMHG | HEIGHT: 62 IN | BODY MASS INDEX: 29.15 KG/M2 | DIASTOLIC BLOOD PRESSURE: 74 MMHG | WEIGHT: 158.4 LBS

## 2022-08-30 DIAGNOSIS — I10 PRIMARY HYPERTENSION: ICD-10-CM

## 2022-08-30 DIAGNOSIS — Z09 HOSPITAL DISCHARGE FOLLOW-UP: Primary | ICD-10-CM

## 2022-08-30 DIAGNOSIS — I21.4 NSTEMI (NON-ST ELEVATED MYOCARDIAL INFARCTION) (HCC): ICD-10-CM

## 2022-08-30 DIAGNOSIS — R06.02 SHORTNESS OF BREATH: ICD-10-CM

## 2022-08-30 DIAGNOSIS — N18.32 STAGE 3B CHRONIC KIDNEY DISEASE (HCC): ICD-10-CM

## 2022-08-30 DIAGNOSIS — I05.9 MITRAL VALVE DISEASE: ICD-10-CM

## 2022-08-30 DIAGNOSIS — R77.8 TROPONIN I ABOVE REFERENCE RANGE: ICD-10-CM

## 2022-08-30 PROBLEM — N18.30 STAGE 3 CHRONIC KIDNEY DISEASE (HCC): Status: ACTIVE | Noted: 2022-08-30

## 2022-08-30 PROCEDURE — 1111F DSCHRG MED/CURRENT MED MERGE: CPT | Performed by: INTERNAL MEDICINE

## 2022-08-30 PROCEDURE — 1123F ACP DISCUSS/DSCN MKR DOCD: CPT | Performed by: INTERNAL MEDICINE

## 2022-08-30 PROCEDURE — 99214 OFFICE O/P EST MOD 30 MIN: CPT | Performed by: INTERNAL MEDICINE

## 2022-08-30 RX ORDER — RANOLAZINE 500 MG/1
500 TABLET, EXTENDED RELEASE ORAL 2 TIMES DAILY
Qty: 60 TABLET | Refills: 3 | Status: SHIPPED | OUTPATIENT
Start: 2022-08-30

## 2022-08-30 RX ORDER — ISOSORBIDE MONONITRATE 30 MG/1
30 TABLET, EXTENDED RELEASE ORAL DAILY
Qty: 30 TABLET | Refills: 3 | Status: SHIPPED | OUTPATIENT
Start: 2022-08-30

## 2022-08-30 RX ORDER — SPIRONOLACTONE 25 MG/1
TABLET ORAL
COMMUNITY
Start: 2022-08-24 | End: 2022-08-30 | Stop reason: ALTCHOICE

## 2022-08-30 RX ORDER — NITROGLYCERIN 0.4 MG/1
0.4 TABLET SUBLINGUAL EVERY 5 MIN PRN
Qty: 25 TABLET | Refills: 3 | Status: SHIPPED | OUTPATIENT
Start: 2022-08-30

## 2022-08-30 RX ORDER — FUROSEMIDE 20 MG/1
20 TABLET ORAL PRN
Qty: 90 TABLET | Refills: 1 | Status: SHIPPED | OUTPATIENT
Start: 2022-08-30

## 2022-08-30 NOTE — PROGRESS NOTES
CARDIOLOGY  NOTE    Chief Complaint: Cardiomyopathy     HPI:   Connie Mcneill is a 80y.o. year old who has Past medical history as noted below. Connie Mcneill comes in with her caretaker she is very hard of hearing but currently living in assisted living facility. She went to [facility] ED due to episode of chest pressure with SOb and was noted ot have abnormal creatinine  1.7 and troponin. She was asked to start aldactone? K is 4.0 , she is very clear she does not want procedures and she wants to be made comfortable if she is having chest pain    She denies any chest pain now  as such she seems to be in very good spirits she was actually admitted and went to the emergency department in December 2021 at that time her troponin peaked at 0.21 but no further work-up was done due to advanced age no particular symptoms and actively hallucinating at the time. Since then an echo has showed normal wall motion function and normal EF but severe mitral regurgitation but she has no signs of volume overload she is still on aspirin and Plavix tolerating it.   Weight has been stable according to her caretaker she has denied any shortness of breath or chest pain  She is DNR [facility]     Current Outpatient Medications   Medication Sig Dispense Refill    isosorbide mononitrate (IMDUR) 30 MG extended release tablet Take 1 tablet by mouth daily 30 tablet 3    ranolazine (RANEXA) 500 MG extended release tablet Take 1 tablet by mouth 2 times daily 60 tablet 3    nitroGLYCERIN (NITROSTAT) 0.4 MG SL tablet Place 1 tablet under the tongue every 5 minutes as needed for Chest pain 25 tablet 3    furosemide (LASIX) 20 MG tablet Take 1 tablet by mouth as needed (for SOB with ankle swelling) 90 tablet 1    SYNTHROID 50 MCG tablet       losartan (COZAAR) 25 MG tablet Take 1 tablet by mouth daily 30 tablet 3    clopidogrel (PLAVIX) 75 MG tablet Take 1 tablet by mouth daily 30 tablet 3    tiZANidine (ZANAFLEX) 2 MG tablet Take 1 tablet by mouth every 12 hours as needed (spasm) 8 tablet 0    lovastatin (MEVACOR) 40 MG tablet Take 40 mg by mouth daily      metoprolol (LOPRESSOR) 50 MG tablet Take 50 mg by mouth 2 times daily       No current facility-administered medications for this visit. Allergies:   Patient has no known allergies. Patient History:  Past Medical History:   Diagnosis Date    Hyperlipidemia     Hypertension      Past Surgical History:   Procedure Laterality Date    APPENDECTOMY      FRACTURE SURGERY      HYSTERECTOMY       No family history on file. Social History     Tobacco Use    Smoking status: Never    Smokeless tobacco: Never   Substance Use Topics    Alcohol use: Yes     Comment: q. six months        Review of Systems:   Constitutional: No Fever or Weight Loss   Eyes: No Decreased Vision  ENT: No Headaches, positive for hearing Loss or Vertigo  Cardiovascular: as per note above   Respiratory: No cough or wheezing and as per note above. Gastrointestinal: No abdominal pain, appetite loss, blood in stools, constipation, diarrhea or heartburn  Genitourinary: No dysuria, trouble voiding, or hematuria  Musculoskeletal:  denies any new  joint aches , swelling  or pain   Integumentary: No rash or pruritis  Neurological: No TIA or stroke symptoms does have memory deficits and he dementia  Psychiatric: No anxiety or depression  Endocrine: No malaise, fatigue or temperature intolerance  Hematologic/Lymphatic: No bleeding problems, blood clots or swollen lymph nodes  Allergic/Immunologic: No nasal congestion or hives    Objective:      Physical Exam:  /74   Pulse 78   Ht 5' 2\" (1.575 m)   Wt 158 lb 6.4 oz (71.8 kg)   BMI 28.97 kg/m²   Wt Readings from Last 3 Encounters:   08/30/22 158 lb 6.4 oz (71.8 kg)   04/19/22 154 lb 6.4 oz (70 kg)   01/19/22 153 lb 9.6 oz (69.7 kg)     Body mass index is 28.97 kg/m².   Vitals:    08/30/22 0925   BP: 136/74   Pulse: 78        General Appearance:  No distress, conversant  Constitutional:  Well developed, Well nourished, No acute distress, Non-toxic appearance. HENT:  Normocephalic, Atraumatic, Bilateral external ears normal, Oropharynx moist, No oral exudates, Nose normal. Neck- Normal range of motion, No tenderness, Supple, No stridor,no apical-carotid delay  Eyes:  PERRL, EOMI, Conjunctiva normal, No discharge. Respiratory:  Normal breath sounds, No respiratory distress, No wheezing, No chest tenderness. ,no use of accessory muscles, NO crackles  Cardiovascular: (PMI) apex non displaced,no lifts no thrills,S1 and S2 audible, No added heart sounds, No signs of ankle edema, or volume overload, No evidence of JVD, No crackles  GI:  Bowel sounds normal, Soft, No tenderness, No masses, No gross visceromegaly   :  No costovertebral angle tenderness   Musculoskeletal:  No edema, no tenderness, no deformities. Back- no tenderness  Integument:  Well hydrated, no rash   Lymphatic:  No lymphadenopathy noted   Neurologic:  Alert & oriented x 3, CN 2-12 normal, normal motor function, normal sensory function, no focal deficits noted   Psychiatric:  Speech and behavior appropriate       Medical decision making and Data review:  DATA:  Lab Results   Component Value Date    TROPONINT 0.115 (HH) 12/12/2021     BNP:    Lab Results   Component Value Date    PROBNP 900.3 (H) 08/26/2022     PT/INR:  No results found for: PTINR  No results found for: LABA1C  Lab Results   Component Value Date    CHOL 213 (H) 12/11/2021    TRIG 157 (H) 12/11/2021    HDL 35 (L) 12/11/2021    LDLDIRECT 152 (H) 12/11/2021     Lab Results   Component Value Date    ALT 5 (L) 08/26/2022    AST 14 (L) 08/26/2022     No results for input(s): WBC, HGB, HCT, MCV, PLT in the last 72 hours.   TSH: No results found for: TSH  Lab Results   Component Value Date    AST 14 (L) 08/26/2022    ALT 5 (L) 08/26/2022    BILITOT 0.8 08/26/2022    ALKPHOS 86 08/26/2022     Lab Results   Component Value Date    PROBNP 900.3 (H) 08/26/2022    PROBNP 2,426 (H) 12/10/2021     No results found for: LABA1C  Lab Results   Component Value Date    WBC 4.0 08/26/2022    HGB 12.0 (L) 08/26/2022    HCT 36.8 (L) 08/26/2022     (L) 08/26/2022     All labs, medications and tests reviewed by myself including data and history from outside source , patient and available family . Assessment & Plan:      1. Hospital discharge follow-up    2. NSTEMI (non-ST elevated myocardial infarction) ([facility] [address]    3. Primary hypertension    4. Shortness of breath    5. Troponin I above reference range    6. Mitral valve disease    7. Stage 3b chronic kidney disease (HCC)         Chest pain Troponin I above reference range  She is very clear does not want any procedures or test. Will add imdur and ranexa . 500 mg bid due to SOB  troponin level in December 2021 but currently asymptomatic continue conservative management advised her to stop using aspirin continue Plavix. Echo shows no wall motion abnormality  Will treat medically as per her  wishes , use nitro as needed , she says if she has a heart attack she wants comfort measures only     Shortness of breath  She has severe mitral regurgitation but she is active does not appear volume overloaded lives independently checks feet regularly which has been stable  Use lasix as needed     Mitral valve disease   On examination appears euvolemic she does not have significant murmur echo shows severe mitral regurg but normal LV size    Primary hypertension  Denies any dizzy spells or passing out events she is currently on losartan 25 mg dailyAnd metoprolol 50 mg twice daily continue to monitor     Dyslipidemia :  All available lab work was reviewed. Continue Mevacor patient was advised to repeat lab work before next visit. Necessary orders were placed , instructions given by myself       Counseled extensively and medication compliance urged. Various goals were discussed and questions answered. Continue current medications. Appropriate prescriptions are addressed and refills ordered. Questions answered and patient verbalizes understanding. Call for any problems, questions, or concerns. Greater than 60 % of time spent counseling besides reviewing data and images     Continue all other medications of all above medical condition listed as is. Return in about 1 month (around 9/30/2022). Please note this report has been partially produced using speech recognition software and may contain errors related to that system including errors in grammar, punctuation, and spelling, as well as words and phrases that may be inappropriate. If there are any questions or concerns please feel free to contact the dictating provider for clarification.

## 2022-09-02 ENCOUNTER — HOSPITAL ENCOUNTER (OUTPATIENT)
Age: 87
Setting detail: SPECIMEN
Discharge: HOME | End: 2022-09-02
Payer: MEDICARE

## 2022-09-02 LAB
ANION GAP SERPL CALCULATED.3IONS-SCNC: 6 MMOL/L (ref 4–16)
BUN BLDV-MCNC: 18 MG/DL (ref 6–23)
CALCIUM SERPL-MCNC: 9.1 MG/DL (ref 8.3–10.6)
CHLORIDE BLD-SCNC: 105 MMOL/L (ref 99–110)
CO2: 28 MMOL/L (ref 21–32)
CREAT SERPL-MCNC: 1.4 MG/DL (ref 0.6–1.1)
GFR AFRICAN AMERICAN: 42 ML/MIN/1.73M2
GFR NON-AFRICAN AMERICAN: 35 ML/MIN/1.73M2
GLUCOSE BLD-MCNC: 89 MG/DL (ref 70–99)
POTASSIUM SERPL-SCNC: 3.9 MMOL/L (ref 3.5–5.1)
SODIUM BLD-SCNC: 139 MMOL/L (ref 135–145)

## 2022-09-02 PROCEDURE — 80048 BASIC METABOLIC PNL TOTAL CA: CPT

## 2022-09-02 PROCEDURE — 36415 COLL VENOUS BLD VENIPUNCTURE: CPT

## 2022-09-09 ENCOUNTER — HOSPITAL ENCOUNTER (OUTPATIENT)
Age: 87
Setting detail: SPECIMEN
Discharge: HOME | End: 2022-09-09
Payer: MEDICARE

## 2022-09-09 LAB
ANION GAP SERPL CALCULATED.3IONS-SCNC: 11 MMOL/L (ref 4–16)
BUN BLDV-MCNC: 15 MG/DL (ref 6–23)
CALCIUM SERPL-MCNC: 9.5 MG/DL (ref 8.3–10.6)
CHLORIDE BLD-SCNC: 102 MMOL/L (ref 99–110)
CO2: 25 MMOL/L (ref 21–32)
CREAT SERPL-MCNC: 1.4 MG/DL (ref 0.6–1.1)
GFR AFRICAN AMERICAN: 42 ML/MIN/1.73M2
GFR NON-AFRICAN AMERICAN: 35 ML/MIN/1.73M2
GLUCOSE BLD-MCNC: 163 MG/DL (ref 70–99)
POTASSIUM SERPL-SCNC: 4.9 MMOL/L (ref 3.5–5.1)
SODIUM BLD-SCNC: 138 MMOL/L (ref 135–145)

## 2022-09-09 PROCEDURE — 80048 BASIC METABOLIC PNL TOTAL CA: CPT

## 2022-09-09 PROCEDURE — 36415 COLL VENOUS BLD VENIPUNCTURE: CPT

## 2022-09-16 ENCOUNTER — HOSPITAL ENCOUNTER (OUTPATIENT)
Age: 87
Setting detail: SPECIMEN
Discharge: HOME | End: 2022-09-16
Payer: MEDICARE

## 2022-09-16 LAB
ANION GAP SERPL CALCULATED.3IONS-SCNC: 11 MMOL/L (ref 4–16)
BUN BLDV-MCNC: 20 MG/DL (ref 6–23)
CALCIUM SERPL-MCNC: 8.9 MG/DL (ref 8.3–10.6)
CHLORIDE BLD-SCNC: 103 MMOL/L (ref 99–110)
CO2: 25 MMOL/L (ref 21–32)
CREAT SERPL-MCNC: 1.5 MG/DL (ref 0.6–1.1)
GFR AFRICAN AMERICAN: 39 ML/MIN/1.73M2
GFR NON-AFRICAN AMERICAN: 32 ML/MIN/1.73M2
GLUCOSE BLD-MCNC: 90 MG/DL (ref 70–99)
HCT VFR BLD CALC: 36.9 % (ref 37–47)
HEMOGLOBIN: 12.5 GM/DL (ref 12.5–16)
MCH RBC QN AUTO: 30.6 PG (ref 27–31)
MCHC RBC AUTO-ENTMCNC: 33.9 % (ref 32–36)
MCV RBC AUTO: 90.4 FL (ref 78–100)
PDW BLD-RTO: 13.2 % (ref 11.7–14.9)
PLATELET # BLD: 126 K/CU MM (ref 140–440)
PMV BLD AUTO: 9.8 FL (ref 7.5–11.1)
POTASSIUM SERPL-SCNC: 5.1 MMOL/L (ref 3.5–5.1)
PRO-BNP: 470.2 PG/ML
RBC # BLD: 4.08 M/CU MM (ref 4.2–5.4)
SODIUM BLD-SCNC: 139 MMOL/L (ref 135–145)
WBC # BLD: 4.9 K/CU MM (ref 4–10.5)

## 2022-09-16 PROCEDURE — 83880 ASSAY OF NATRIURETIC PEPTIDE: CPT

## 2022-09-16 PROCEDURE — 85027 COMPLETE CBC AUTOMATED: CPT

## 2022-09-16 PROCEDURE — 36415 COLL VENOUS BLD VENIPUNCTURE: CPT

## 2022-09-16 PROCEDURE — 80048 BASIC METABOLIC PNL TOTAL CA: CPT

## 2022-09-23 ENCOUNTER — HOSPITAL ENCOUNTER (OUTPATIENT)
Age: 87
Setting detail: SPECIMEN
Discharge: HOME | End: 2022-09-23
Payer: MEDICARE

## 2022-09-23 LAB
ANION GAP SERPL CALCULATED.3IONS-SCNC: 9 MMOL/L (ref 4–16)
BUN BLDV-MCNC: 20 MG/DL (ref 6–23)
CALCIUM SERPL-MCNC: 8.8 MG/DL (ref 8.3–10.6)
CHLORIDE BLD-SCNC: 103 MMOL/L (ref 99–110)
CO2: 25 MMOL/L (ref 21–32)
CREAT SERPL-MCNC: 1.4 MG/DL (ref 0.6–1.1)
GFR AFRICAN AMERICAN: 42 ML/MIN/1.73M2
GFR NON-AFRICAN AMERICAN: 35 ML/MIN/1.73M2
GLUCOSE BLD-MCNC: 88 MG/DL (ref 70–99)
POTASSIUM SERPL-SCNC: 4.7 MMOL/L (ref 3.5–5.1)
SODIUM BLD-SCNC: 137 MMOL/L (ref 135–145)

## 2022-09-23 PROCEDURE — 36415 COLL VENOUS BLD VENIPUNCTURE: CPT

## 2022-09-23 PROCEDURE — 80048 BASIC METABOLIC PNL TOTAL CA: CPT

## 2022-09-30 ENCOUNTER — HOSPITAL ENCOUNTER (OUTPATIENT)
Age: 87
Setting detail: SPECIMEN
Discharge: HOME | End: 2022-09-30
Payer: MEDICARE

## 2022-09-30 LAB
ANION GAP SERPL CALCULATED.3IONS-SCNC: 9 MMOL/L (ref 4–16)
BUN BLDV-MCNC: 19 MG/DL (ref 6–23)
CALCIUM SERPL-MCNC: 9.1 MG/DL (ref 8.3–10.6)
CHLORIDE BLD-SCNC: 100 MMOL/L (ref 99–110)
CO2: 27 MMOL/L (ref 21–32)
CREAT SERPL-MCNC: 1.3 MG/DL (ref 0.6–1.1)
GFR AFRICAN AMERICAN: 46 ML/MIN/1.73M2
GFR NON-AFRICAN AMERICAN: 38 ML/MIN/1.73M2
GLUCOSE BLD-MCNC: 80 MG/DL (ref 70–99)
POTASSIUM SERPL-SCNC: 4.5 MMOL/L (ref 3.5–5.1)
SODIUM BLD-SCNC: 136 MMOL/L (ref 135–145)

## 2022-09-30 PROCEDURE — 36415 COLL VENOUS BLD VENIPUNCTURE: CPT

## 2022-09-30 PROCEDURE — 80048 BASIC METABOLIC PNL TOTAL CA: CPT

## 2022-10-06 ENCOUNTER — HOSPITAL ENCOUNTER (OUTPATIENT)
Age: 87
Setting detail: SPECIMEN
Discharge: HOME | End: 2022-10-06

## 2022-10-06 LAB
ALBUMIN SERPL-MCNC: 4 GM/DL (ref 3.4–5)
ALP BLD-CCNC: 110 IU/L (ref 40–128)
ALT SERPL-CCNC: 18 U/L (ref 10–40)
ANION GAP SERPL CALCULATED.3IONS-SCNC: 13 MMOL/L (ref 4–16)
AST SERPL-CCNC: 30 IU/L (ref 15–37)
BILIRUB SERPL-MCNC: 0.9 MG/DL (ref 0–1)
BUN BLDV-MCNC: 26 MG/DL (ref 6–23)
CALCIUM SERPL-MCNC: 9 MG/DL (ref 8.3–10.6)
CHLORIDE BLD-SCNC: 96 MMOL/L (ref 99–110)
CO2: 23 MMOL/L (ref 21–32)
CREAT SERPL-MCNC: 1.7 MG/DL (ref 0.6–1.1)
GFR AFRICAN AMERICAN: 34 ML/MIN/1.73M2
GFR NON-AFRICAN AMERICAN: 28 ML/MIN/1.73M2
GLUCOSE BLD-MCNC: 212 MG/DL (ref 70–99)
HCT VFR BLD CALC: 37.1 % (ref 37–47)
HEMOGLOBIN: 12.2 GM/DL (ref 12.5–16)
MCH RBC QN AUTO: 30.4 PG (ref 27–31)
MCHC RBC AUTO-ENTMCNC: 32.9 % (ref 32–36)
MCV RBC AUTO: 92.5 FL (ref 78–100)
PDW BLD-RTO: 13.8 % (ref 11.7–14.9)
PLATELET # BLD: 134 K/CU MM (ref 140–440)
PMV BLD AUTO: 10.1 FL (ref 7.5–11.1)
POTASSIUM SERPL-SCNC: 4 MMOL/L (ref 3.5–5.1)
PRO-BNP: 2920 PG/ML
PROCALCITONIN: 0.22
RBC # BLD: 4.01 M/CU MM (ref 4.2–5.4)
SODIUM BLD-SCNC: 132 MMOL/L (ref 135–145)
TOTAL PROTEIN: 6.3 GM/DL (ref 6.4–8.2)
WBC # BLD: 5.1 K/CU MM (ref 4–10.5)

## 2022-10-06 PROCEDURE — 80053 COMPREHEN METABOLIC PANEL: CPT

## 2022-10-06 PROCEDURE — 36415 COLL VENOUS BLD VENIPUNCTURE: CPT

## 2022-10-06 PROCEDURE — 85027 COMPLETE CBC AUTOMATED: CPT

## 2022-10-06 PROCEDURE — 83880 ASSAY OF NATRIURETIC PEPTIDE: CPT

## 2022-10-06 PROCEDURE — 84145 PROCALCITONIN (PCT): CPT

## 2022-10-07 ENCOUNTER — HOSPITAL ENCOUNTER (OUTPATIENT)
Age: 87
Setting detail: SPECIMEN
Discharge: HOME | End: 2022-10-07

## 2022-10-07 LAB
ANION GAP SERPL CALCULATED.3IONS-SCNC: 13 MMOL/L (ref 4–16)
BUN BLDV-MCNC: 26 MG/DL (ref 6–23)
CALCIUM SERPL-MCNC: 8.8 MG/DL (ref 8.3–10.6)
CHLORIDE BLD-SCNC: 100 MMOL/L (ref 99–110)
CO2: 22 MMOL/L (ref 21–32)
CREAT SERPL-MCNC: 1.6 MG/DL (ref 0.6–1.1)
GFR AFRICAN AMERICAN: 36 ML/MIN/1.73M2
GFR NON-AFRICAN AMERICAN: 30 ML/MIN/1.73M2
GLUCOSE BLD-MCNC: 106 MG/DL (ref 70–99)
POTASSIUM SERPL-SCNC: 3.7 MMOL/L (ref 3.5–5.1)
PRO-BNP: 2697 PG/ML
SODIUM BLD-SCNC: 135 MMOL/L (ref 135–145)

## 2022-10-07 PROCEDURE — 83880 ASSAY OF NATRIURETIC PEPTIDE: CPT

## 2022-10-07 PROCEDURE — 36415 COLL VENOUS BLD VENIPUNCTURE: CPT

## 2022-10-07 PROCEDURE — 86480 TB TEST CELL IMMUN MEASURE: CPT

## 2022-10-07 PROCEDURE — 80048 BASIC METABOLIC PNL TOTAL CA: CPT

## 2022-10-11 ENCOUNTER — HOSPITAL ENCOUNTER (OUTPATIENT)
Age: 87
Setting detail: SPECIMEN
Discharge: HOME | End: 2022-10-11

## 2022-10-11 LAB
ANION GAP SERPL CALCULATED.3IONS-SCNC: 15 MMOL/L (ref 4–16)
BUN BLDV-MCNC: 25 MG/DL (ref 6–23)
CALCIUM SERPL-MCNC: 10.8 MG/DL (ref 8.3–10.6)
CHLORIDE BLD-SCNC: 97 MMOL/L (ref 99–110)
CO2: 24 MMOL/L (ref 21–32)
CREAT SERPL-MCNC: 1.5 MG/DL (ref 0.6–1.1)
GFR AFRICAN AMERICAN: 39 ML/MIN/1.73M2
GFR NON-AFRICAN AMERICAN: 32 ML/MIN/1.73M2
GLUCOSE BLD-MCNC: 109 MG/DL (ref 70–99)
POTASSIUM SERPL-SCNC: 4.2 MMOL/L (ref 3.5–5.1)
PRO-BNP: 5595 PG/ML
SODIUM BLD-SCNC: 136 MMOL/L (ref 135–145)

## 2022-10-11 PROCEDURE — 83880 ASSAY OF NATRIURETIC PEPTIDE: CPT

## 2022-10-11 PROCEDURE — 80048 BASIC METABOLIC PNL TOTAL CA: CPT

## 2022-10-11 PROCEDURE — 36415 COLL VENOUS BLD VENIPUNCTURE: CPT

## 2022-10-14 ENCOUNTER — HOSPITAL ENCOUNTER (OUTPATIENT)
Age: 87
Setting detail: SPECIMEN
Discharge: HOME | End: 2022-10-14

## 2022-10-14 LAB
ANION GAP SERPL CALCULATED.3IONS-SCNC: 12 MMOL/L (ref 4–16)
BUN BLDV-MCNC: 19 MG/DL (ref 6–23)
CALCIUM SERPL-MCNC: 10.4 MG/DL (ref 8.3–10.6)
CHLORIDE BLD-SCNC: 95 MMOL/L (ref 99–110)
CO2: 27 MMOL/L (ref 21–32)
CREAT SERPL-MCNC: 1.3 MG/DL (ref 0.6–1.1)
GFR AFRICAN AMERICAN: 46 ML/MIN/1.73M2
GFR NON-AFRICAN AMERICAN: 38 ML/MIN/1.73M2
GLUCOSE BLD-MCNC: 105 MG/DL (ref 70–99)
POTASSIUM SERPL-SCNC: 3.8 MMOL/L (ref 3.5–5.1)
SODIUM BLD-SCNC: 134 MMOL/L (ref 135–145)

## 2022-10-14 PROCEDURE — 80048 BASIC METABOLIC PNL TOTAL CA: CPT

## 2022-10-14 PROCEDURE — 36415 COLL VENOUS BLD VENIPUNCTURE: CPT

## 2022-10-15 LAB
QUANTI TB1 MINUS NIL: 0 IU/ML (ref 0–0.34)
QUANTI TB2 MINUS NIL: 0 IU/ML (ref 0–0.34)
QUANTIFERON (R) TB GOLD (INCUBATED): ABNORMAL IU/ML
QUANTIFERON MITOGEN MINUS NIL: 0.1 IU/ML
QUANTIFERON NIL: 0.03 IU/ML

## 2022-10-18 ENCOUNTER — HOSPITAL ENCOUNTER (OUTPATIENT)
Age: 87
Setting detail: SPECIMEN
Discharge: HOME | End: 2022-10-18

## 2022-10-18 LAB
ANION GAP SERPL CALCULATED.3IONS-SCNC: 14 MMOL/L (ref 4–16)
BUN BLDV-MCNC: 18 MG/DL (ref 6–23)
CALCIUM SERPL-MCNC: 10.4 MG/DL (ref 8.3–10.6)
CHLORIDE BLD-SCNC: 99 MMOL/L (ref 99–110)
CO2: 24 MMOL/L (ref 21–32)
CREAT SERPL-MCNC: 1.1 MG/DL (ref 0.6–1.1)
GFR SERPL CREATININE-BSD FRML MDRD: 46 ML/MIN/1.73M2
GLUCOSE BLD-MCNC: 97 MG/DL (ref 70–99)
POTASSIUM SERPL-SCNC: 3.3 MMOL/L (ref 3.5–5.1)
PRO-BNP: 2112 PG/ML
SODIUM BLD-SCNC: 137 MMOL/L (ref 135–145)

## 2022-10-18 PROCEDURE — 36415 COLL VENOUS BLD VENIPUNCTURE: CPT

## 2022-10-18 PROCEDURE — 80048 BASIC METABOLIC PNL TOTAL CA: CPT

## 2022-10-18 PROCEDURE — 83880 ASSAY OF NATRIURETIC PEPTIDE: CPT

## 2022-10-21 ENCOUNTER — HOSPITAL ENCOUNTER (OUTPATIENT)
Dept: GENERAL RADIOLOGY | Age: 87
Discharge: HOME | End: 2022-10-21
Payer: MEDICARE

## 2022-10-21 DIAGNOSIS — K22.2 ESOPHAGEAL OBSTRUCTION: ICD-10-CM

## 2022-10-21 PROCEDURE — 74240 X-RAY XM UPR GI TRC 1CNTRST: CPT

## 2023-03-22 ENCOUNTER — HOSPITAL ENCOUNTER (OUTPATIENT)
Age: 88
Setting detail: SPECIMEN
Discharge: HOME | End: 2023-03-22

## 2023-03-22 LAB
ALBUMIN SERPL-MCNC: 3.4 GM/DL (ref 3.4–5)
ALP BLD-CCNC: 99 IU/L (ref 40–128)
ALT SERPL-CCNC: 10 U/L (ref 10–40)
ANION GAP SERPL CALCULATED.3IONS-SCNC: 12 MMOL/L (ref 4–16)
AST SERPL-CCNC: 24 IU/L (ref 15–37)
BILIRUB SERPL-MCNC: 0.8 MG/DL (ref 0–1)
BUN SERPL-MCNC: 26 MG/DL (ref 6–23)
CALCIUM SERPL-MCNC: 8.4 MG/DL (ref 8.3–10.6)
CHLORIDE BLD-SCNC: 101 MMOL/L (ref 99–110)
CO2: 22 MMOL/L (ref 21–32)
CREAT SERPL-MCNC: 1.7 MG/DL (ref 0.6–1.1)
GFR SERPL CREATININE-BSD FRML MDRD: 27 ML/MIN/1.73M2
GLUCOSE SERPL-MCNC: 113 MG/DL (ref 70–99)
HCT VFR BLD CALC: 34.2 % (ref 37–47)
HEMOGLOBIN: 11.3 GM/DL (ref 12.5–16)
MCH RBC QN AUTO: 29.4 PG (ref 27–31)
MCHC RBC AUTO-ENTMCNC: 33 % (ref 32–36)
MCV RBC AUTO: 88.8 FL (ref 78–100)
PDW BLD-RTO: 13.4 % (ref 11.7–14.9)
PLATELET # BLD: 124 K/CU MM (ref 140–440)
PMV BLD AUTO: 10.2 FL (ref 7.5–11.1)
POTASSIUM SERPL-SCNC: 3.5 MMOL/L (ref 3.5–5.1)
RBC # BLD: 3.85 M/CU MM (ref 4.2–5.4)
SODIUM BLD-SCNC: 135 MMOL/L (ref 135–145)
TOTAL PROTEIN: 5.5 GM/DL (ref 6.4–8.2)
TSH SERPL DL<=0.005 MIU/L-ACNC: 2.66 UIU/ML (ref 0.27–4.2)
WBC # BLD: 6.5 K/CU MM (ref 4–10.5)

## 2023-03-22 PROCEDURE — 84443 ASSAY THYROID STIM HORMONE: CPT

## 2023-03-22 PROCEDURE — 80053 COMPREHEN METABOLIC PANEL: CPT

## 2023-03-22 PROCEDURE — 85027 COMPLETE CBC AUTOMATED: CPT

## 2023-03-22 PROCEDURE — 86480 TB TEST CELL IMMUN MEASURE: CPT

## 2023-03-22 PROCEDURE — 36415 COLL VENOUS BLD VENIPUNCTURE: CPT

## 2023-03-25 LAB
QUANTI TB1 MINUS NIL: 0.01 IU/ML (ref 0–0.34)
QUANTI TB2 MINUS NIL: 0.02 IU/ML (ref 0–0.34)
QUANTIFERON (R) TB GOLD (INCUBATED): NEGATIVE IU/ML
QUANTIFERON MITOGEN MINUS NIL: 4.2 IU/ML
QUANTIFERON NIL: 0.07 IU/ML

## 2023-03-28 ENCOUNTER — HOSPITAL ENCOUNTER (OUTPATIENT)
Age: 88
Setting detail: SPECIMEN
Discharge: HOME | End: 2023-03-28

## 2023-03-28 LAB
ANION GAP SERPL CALCULATED.3IONS-SCNC: 8 MMOL/L (ref 4–16)
BUN SERPL-MCNC: 12 MG/DL (ref 6–23)
CALCIUM SERPL-MCNC: 8.7 MG/DL (ref 8.3–10.6)
CHLORIDE BLD-SCNC: 106 MMOL/L (ref 99–110)
CO2: 27 MMOL/L (ref 21–32)
CREAT SERPL-MCNC: 1.2 MG/DL (ref 0.6–1.1)
GFR SERPL CREATININE-BSD FRML MDRD: 41 ML/MIN/1.73M2
GLUCOSE SERPL-MCNC: 90 MG/DL (ref 70–99)
POTASSIUM SERPL-SCNC: 3.7 MMOL/L (ref 3.5–5.1)
SODIUM BLD-SCNC: 141 MMOL/L (ref 135–145)

## 2023-03-28 PROCEDURE — 80048 BASIC METABOLIC PNL TOTAL CA: CPT

## 2023-03-28 PROCEDURE — 36415 COLL VENOUS BLD VENIPUNCTURE: CPT

## 2023-05-24 ENCOUNTER — HOSPITAL ENCOUNTER (OUTPATIENT)
Age: 88
Setting detail: SPECIMEN
Discharge: HOME | End: 2023-05-24

## 2023-05-24 LAB
25(OH)D3 SERPL-MCNC: 18.17 NG/ML
ALBUMIN SERPL-MCNC: 4.2 GM/DL (ref 3.4–5)
ALP BLD-CCNC: 106 IU/L (ref 40–129)
ALT SERPL-CCNC: 8 U/L (ref 10–40)
ANION GAP SERPL CALCULATED.3IONS-SCNC: 11 MMOL/L (ref 4–16)
AST SERPL-CCNC: 18 IU/L (ref 15–37)
BILIRUB SERPL-MCNC: 1 MG/DL (ref 0–1)
BUN SERPL-MCNC: 14 MG/DL (ref 6–23)
CALCIUM SERPL-MCNC: 8.8 MG/DL (ref 8.3–10.6)
CHLORIDE BLD-SCNC: 96 MMOL/L (ref 99–110)
CO2: 24 MMOL/L (ref 21–32)
CREAT SERPL-MCNC: 0.9 MG/DL (ref 0.6–1.1)
GFR SERPL CREATININE-BSD FRML MDRD: 58 ML/MIN/1.73M2
GLUCOSE SERPL-MCNC: 109 MG/DL (ref 70–99)
HCT VFR BLD CALC: 35.7 % (ref 37–47)
HEMOGLOBIN: 11.9 GM/DL (ref 12.5–16)
MCH RBC QN AUTO: 29.2 PG (ref 27–31)
MCHC RBC AUTO-ENTMCNC: 33.3 % (ref 32–36)
MCV RBC AUTO: 87.7 FL (ref 78–100)
PDW BLD-RTO: 14.4 % (ref 11.7–14.9)
PLATELET # BLD: 142 K/CU MM (ref 140–440)
PMV BLD AUTO: 10.1 FL (ref 7.5–11.1)
POTASSIUM SERPL-SCNC: 3.4 MMOL/L (ref 3.5–5.1)
RBC # BLD: 4.07 M/CU MM (ref 4.2–5.4)
SODIUM BLD-SCNC: 131 MMOL/L (ref 135–145)
TOTAL PROTEIN: 6.7 GM/DL (ref 6.4–8.2)
TSH SERPL DL<=0.005 MIU/L-ACNC: 5.42 UIU/ML (ref 0.27–4.2)
WBC # BLD: 7.7 K/CU MM (ref 4–10.5)

## 2023-05-24 PROCEDURE — 84443 ASSAY THYROID STIM HORMONE: CPT

## 2023-05-24 PROCEDURE — 80053 COMPREHEN METABOLIC PANEL: CPT

## 2023-05-24 PROCEDURE — 82306 VITAMIN D 25 HYDROXY: CPT

## 2023-05-24 PROCEDURE — 85027 COMPLETE CBC AUTOMATED: CPT

## 2023-05-24 PROCEDURE — 86480 TB TEST CELL IMMUN MEASURE: CPT

## 2023-05-26 LAB
QUANTI TB1 MINUS NIL: 0 IU/ML (ref 0–0.34)
QUANTI TB2 MINUS NIL: 0 IU/ML (ref 0–0.34)
QUANTIFERON (R) TB GOLD (INCUBATED): NEGATIVE IU/ML
QUANTIFERON MITOGEN MINUS NIL: 4.35 IU/ML
QUANTIFERON NIL: 0.01 IU/ML

## 2023-06-03 ENCOUNTER — HOSPITAL ENCOUNTER (OUTPATIENT)
Age: 88
Setting detail: SPECIMEN
Discharge: HOME | End: 2023-06-03

## 2023-06-03 LAB
ALBUMIN SERPL-MCNC: 4 GM/DL (ref 3.4–5)
ALP BLD-CCNC: 155 IU/L (ref 40–129)
ALT SERPL-CCNC: 10 U/L (ref 10–40)
ANION GAP SERPL CALCULATED.3IONS-SCNC: 13 MMOL/L (ref 4–16)
AST SERPL-CCNC: 19 IU/L (ref 15–37)
BILIRUB SERPL-MCNC: 0.7 MG/DL (ref 0–1)
BUN SERPL-MCNC: 18 MG/DL (ref 6–23)
CALCIUM SERPL-MCNC: 8.5 MG/DL (ref 8.3–10.6)
CHLORIDE BLD-SCNC: 92 MMOL/L (ref 99–110)
CO2: 27 MMOL/L (ref 21–32)
CREAT SERPL-MCNC: 1.1 MG/DL (ref 0.6–1.1)
GFR SERPL CREATININE-BSD FRML MDRD: 46 ML/MIN/1.73M2
GLUCOSE SERPL-MCNC: 112 MG/DL (ref 70–99)
HCT VFR BLD CALC: 34.8 % (ref 37–47)
HEMOGLOBIN: 11.6 GM/DL (ref 12.5–16)
MCH RBC QN AUTO: 29.4 PG (ref 27–31)
MCHC RBC AUTO-ENTMCNC: 33.3 % (ref 32–36)
MCV RBC AUTO: 88.1 FL (ref 78–100)
PDW BLD-RTO: 14.7 % (ref 11.7–14.9)
PLATELET # BLD: 263 K/CU MM (ref 140–440)
PMV BLD AUTO: 8.9 FL (ref 7.5–11.1)
POTASSIUM SERPL-SCNC: 4.2 MMOL/L (ref 3.5–5.1)
RBC # BLD: 3.95 M/CU MM (ref 4.2–5.4)
SODIUM BLD-SCNC: 132 MMOL/L (ref 135–145)
TOTAL PROTEIN: 6.3 GM/DL (ref 6.4–8.2)
WBC # BLD: 6.1 K/CU MM (ref 4–10.5)

## 2023-06-03 PROCEDURE — 36415 COLL VENOUS BLD VENIPUNCTURE: CPT

## 2023-06-03 PROCEDURE — 85027 COMPLETE CBC AUTOMATED: CPT

## 2023-06-03 PROCEDURE — 80053 COMPREHEN METABOLIC PANEL: CPT

## 2023-06-03 PROCEDURE — 9900360100 HC STAT COLLECTION FEE SNF

## 2023-06-27 ENCOUNTER — HOSPITAL ENCOUNTER (OUTPATIENT)
Age: 88
Setting detail: SPECIMEN
Discharge: HOME | End: 2023-06-27
Payer: MEDICARE

## 2023-06-27 LAB
ANION GAP SERPL CALCULATED.3IONS-SCNC: 12 MMOL/L (ref 4–16)
BUN SERPL-MCNC: 16 MG/DL (ref 6–23)
CALCIUM SERPL-MCNC: 9.2 MG/DL (ref 8.3–10.6)
CHLORIDE BLD-SCNC: 103 MMOL/L (ref 99–110)
CO2: 25 MMOL/L (ref 21–32)
CREAT SERPL-MCNC: 1.1 MG/DL (ref 0.6–1.1)
GFR SERPL CREATININE-BSD FRML MDRD: 46 ML/MIN/1.73M2
GLUCOSE SERPL-MCNC: 76 MG/DL (ref 70–99)
HCT VFR BLD CALC: 36.4 % (ref 37–47)
HEMOGLOBIN: 11.5 GM/DL (ref 12.5–16)
MCH RBC QN AUTO: 29.7 PG (ref 27–31)
MCHC RBC AUTO-ENTMCNC: 31.6 % (ref 32–36)
MCV RBC AUTO: 94.1 FL (ref 78–100)
PDW BLD-RTO: 14.7 % (ref 11.7–14.9)
PLATELET # BLD: 151 K/CU MM (ref 140–440)
PMV BLD AUTO: 9.2 FL (ref 7.5–11.1)
POTASSIUM SERPL-SCNC: 4.6 MMOL/L (ref 3.5–5.1)
RBC # BLD: 3.87 M/CU MM (ref 4.2–5.4)
SODIUM BLD-SCNC: 140 MMOL/L (ref 135–145)
WBC # BLD: 2.6 K/CU MM (ref 4–10.5)

## 2023-06-27 PROCEDURE — 80048 BASIC METABOLIC PNL TOTAL CA: CPT

## 2023-06-27 PROCEDURE — 85027 COMPLETE CBC AUTOMATED: CPT

## 2023-06-27 PROCEDURE — 36415 COLL VENOUS BLD VENIPUNCTURE: CPT
